# Patient Record
Sex: FEMALE | Race: WHITE | NOT HISPANIC OR LATINO | URBAN - METROPOLITAN AREA
[De-identification: names, ages, dates, MRNs, and addresses within clinical notes are randomized per-mention and may not be internally consistent; named-entity substitution may affect disease eponyms.]

---

## 2018-03-20 ENCOUNTER — INPATIENT (INPATIENT)
Facility: HOSPITAL | Age: 73
LOS: 2 days | Discharge: NURSING, REHAB OR LONG-TERM CARE | DRG: 872 | End: 2018-03-23
Attending: INTERNAL MEDICINE | Admitting: HOSPITALIST
Payer: MEDICARE

## 2018-03-20 VITALS
HEIGHT: 64 IN | OXYGEN SATURATION: 100 % | SYSTOLIC BLOOD PRESSURE: 75 MMHG | WEIGHT: 160.06 LBS | TEMPERATURE: 98 F | DIASTOLIC BLOOD PRESSURE: 54 MMHG | HEART RATE: 74 BPM

## 2018-03-20 DIAGNOSIS — N19 UNSPECIFIED KIDNEY FAILURE: ICD-10-CM

## 2018-03-20 DIAGNOSIS — Z94.0 KIDNEY TRANSPLANT STATUS: Chronic | ICD-10-CM

## 2018-03-20 LAB
ABO RH CONFIRMATION: SIGNIFICANT CHANGE UP
ALBUMIN SERPL ELPH-MCNC: 3.1 G/DL — LOW (ref 3.3–5.2)
ALP SERPL-CCNC: 251 U/L — HIGH (ref 40–120)
ALT FLD-CCNC: 19 U/L — SIGNIFICANT CHANGE UP
ANION GAP SERPL CALC-SCNC: 24 MMOL/L — HIGH (ref 5–17)
ANION GAP SERPL CALC-SCNC: 27 MMOL/L — HIGH (ref 5–17)
APAP SERPL-MCNC: <7.5 UG/ML — LOW (ref 10–26)
APPEARANCE UR: SIGNIFICANT CHANGE UP
APTT BLD: 28.7 SEC — SIGNIFICANT CHANGE UP (ref 27.5–37.4)
AST SERPL-CCNC: 27 U/L — SIGNIFICANT CHANGE UP
BACTERIA # UR AUTO: ABNORMAL
BILIRUB SERPL-MCNC: 0.4 MG/DL — SIGNIFICANT CHANGE UP (ref 0.4–2)
BILIRUB UR-MCNC: ABNORMAL
BLD GP AB SCN SERPL QL: SIGNIFICANT CHANGE UP
BUN SERPL-MCNC: 105 MG/DL — HIGH (ref 8–20)
BUN SERPL-MCNC: 111 MG/DL — HIGH (ref 8–20)
CALCIUM SERPL-MCNC: 8.5 MG/DL — LOW (ref 8.6–10.2)
CALCIUM SERPL-MCNC: 9.3 MG/DL — SIGNIFICANT CHANGE UP (ref 8.6–10.2)
CHLORIDE SERPL-SCNC: 94 MMOL/L — LOW (ref 98–107)
CHLORIDE SERPL-SCNC: 98 MMOL/L — SIGNIFICANT CHANGE UP (ref 98–107)
CK MB CFR SERPL CALC: 6.1 NG/ML — SIGNIFICANT CHANGE UP (ref 0–6.7)
CK SERPL-CCNC: 328 U/L — HIGH (ref 25–170)
CO2 SERPL-SCNC: 12 MMOL/L — LOW (ref 22–29)
CO2 SERPL-SCNC: 13 MMOL/L — LOW (ref 22–29)
COLOR SPEC: ABNORMAL
COMMENT - URINE: SIGNIFICANT CHANGE UP
CREAT SERPL-MCNC: 5.98 MG/DL — HIGH (ref 0.5–1.3)
CREAT SERPL-MCNC: 6.27 MG/DL — HIGH (ref 0.5–1.3)
DIFF PNL FLD: ABNORMAL
EPI CELLS # UR: SIGNIFICANT CHANGE UP
GAS PNL BLDV: SIGNIFICANT CHANGE UP
GLUCOSE SERPL-MCNC: 108 MG/DL — SIGNIFICANT CHANGE UP (ref 70–115)
GLUCOSE SERPL-MCNC: 99 MG/DL — SIGNIFICANT CHANGE UP (ref 70–115)
GLUCOSE UR QL: NEGATIVE — SIGNIFICANT CHANGE UP
HCT VFR BLD CALC: 24.3 % — LOW (ref 37–47)
HCT VFR BLD CALC: 29.1 % — LOW (ref 37–47)
HGB BLD-MCNC: 7.6 G/DL — LOW (ref 12–16)
HGB BLD-MCNC: 9.2 G/DL — LOW (ref 12–16)
INR BLD: 1.09 RATIO — SIGNIFICANT CHANGE UP (ref 0.88–1.16)
KETONES UR-MCNC: ABNORMAL
LEUKOCYTE ESTERASE UR-ACNC: ABNORMAL
LIDOCAIN IGE QN: 282 U/L — HIGH (ref 22–51)
LYMPHOCYTES # BLD AUTO: 10 % — LOW (ref 20–55)
MAGNESIUM SERPL-MCNC: 1.1 MG/DL — LOW (ref 1.6–2.6)
MAGNESIUM SERPL-MCNC: 1.9 MG/DL — SIGNIFICANT CHANGE UP (ref 1.6–2.6)
MCHC RBC-ENTMCNC: 30.4 PG — SIGNIFICANT CHANGE UP (ref 27–31)
MCHC RBC-ENTMCNC: 30.5 PG — SIGNIFICANT CHANGE UP (ref 27–31)
MCHC RBC-ENTMCNC: 31.3 G/DL — LOW (ref 32–36)
MCHC RBC-ENTMCNC: 31.6 G/DL — LOW (ref 32–36)
MCV RBC AUTO: 96.4 FL — SIGNIFICANT CHANGE UP (ref 81–99)
MCV RBC AUTO: 97.2 FL — SIGNIFICANT CHANGE UP (ref 81–99)
MONOCYTES NFR BLD AUTO: 8 % — SIGNIFICANT CHANGE UP (ref 3–10)
NEUTROPHILS NFR BLD AUTO: 80 % — HIGH (ref 37–73)
NEUTS BAND # BLD: 2 % — SIGNIFICANT CHANGE UP (ref 0–8)
NITRITE UR-MCNC: NEGATIVE — SIGNIFICANT CHANGE UP
PH UR: 5 — SIGNIFICANT CHANGE UP (ref 5–8)
PHOSPHATE SERPL-MCNC: 5.6 MG/DL — HIGH (ref 2.4–4.7)
PLAT MORPH BLD: NORMAL — SIGNIFICANT CHANGE UP
PLATELET # BLD AUTO: 254 K/UL — SIGNIFICANT CHANGE UP (ref 150–400)
PLATELET # BLD AUTO: 303 K/UL — SIGNIFICANT CHANGE UP (ref 150–400)
POTASSIUM SERPL-MCNC: 4.2 MMOL/L — SIGNIFICANT CHANGE UP (ref 3.5–5.3)
POTASSIUM SERPL-MCNC: 4.7 MMOL/L — SIGNIFICANT CHANGE UP (ref 3.5–5.3)
POTASSIUM SERPL-SCNC: 4.2 MMOL/L — SIGNIFICANT CHANGE UP (ref 3.5–5.3)
POTASSIUM SERPL-SCNC: 4.7 MMOL/L — SIGNIFICANT CHANGE UP (ref 3.5–5.3)
PROT SERPL-MCNC: 7 G/DL — SIGNIFICANT CHANGE UP (ref 6.6–8.7)
PROT UR-MCNC: 100
PROTHROM AB SERPL-ACNC: 12 SEC — SIGNIFICANT CHANGE UP (ref 9.8–12.7)
RBC # BLD: 2.5 M/UL — LOW (ref 4.4–5.2)
RBC # BLD: 3.02 M/UL — LOW (ref 4.4–5.2)
RBC # FLD: 15 % — SIGNIFICANT CHANGE UP (ref 11–15.6)
RBC # FLD: 15 % — SIGNIFICANT CHANGE UP (ref 11–15.6)
RBC BLD AUTO: NORMAL — SIGNIFICANT CHANGE UP
RBC CASTS # UR COMP ASSIST: ABNORMAL /HPF (ref 0–4)
SALICYLATES SERPL-MCNC: <0.6 MG/DL — LOW (ref 10–20)
SODIUM SERPL-SCNC: 133 MMOL/L — LOW (ref 135–145)
SODIUM SERPL-SCNC: 135 MMOL/L — SIGNIFICANT CHANGE UP (ref 135–145)
SP GR SPEC: 1.02 — SIGNIFICANT CHANGE UP (ref 1.01–1.02)
T3 SERPL-MCNC: 62 NG/DL — LOW (ref 80–200)
T4 AB SER-ACNC: 4.2 UG/DL — LOW (ref 4.5–12)
TSH SERPL-MCNC: 4.54 UIU/ML — HIGH (ref 0.27–4.2)
TYPE + AB SCN PNL BLD: SIGNIFICANT CHANGE UP
UROBILINOGEN FLD QL: NEGATIVE — SIGNIFICANT CHANGE UP
WBC # BLD: 11.8 K/UL — HIGH (ref 4.8–10.8)
WBC # BLD: 13.4 K/UL — HIGH (ref 4.8–10.8)
WBC # FLD AUTO: 11.8 K/UL — HIGH (ref 4.8–10.8)
WBC # FLD AUTO: 13.4 K/UL — HIGH (ref 4.8–10.8)
WBC UR QL: SIGNIFICANT CHANGE UP /HPF (ref 0–5)

## 2018-03-20 PROCEDURE — 99223 1ST HOSP IP/OBS HIGH 75: CPT

## 2018-03-20 PROCEDURE — 71045 X-RAY EXAM CHEST 1 VIEW: CPT | Mod: 26

## 2018-03-20 PROCEDURE — 70450 CT HEAD/BRAIN W/O DYE: CPT | Mod: 26

## 2018-03-20 PROCEDURE — 99285 EMERGENCY DEPT VISIT HI MDM: CPT

## 2018-03-20 PROCEDURE — 93010 ELECTROCARDIOGRAM REPORT: CPT

## 2018-03-20 RX ORDER — CYCLOSPORINE 100 MG/1
3 CAPSULE ORAL
Qty: 0 | Refills: 0 | Status: DISCONTINUED | OUTPATIENT
Start: 2018-03-20 | End: 2018-03-20

## 2018-03-20 RX ORDER — CYCLOSPORINE 100 MG/1
75 CAPSULE ORAL
Qty: 0 | Refills: 0 | Status: DISCONTINUED | OUTPATIENT
Start: 2018-03-20 | End: 2018-03-23

## 2018-03-20 RX ORDER — ASPIRIN/CALCIUM CARB/MAGNESIUM 324 MG
81 TABLET ORAL DAILY
Qty: 0 | Refills: 0 | Status: DISCONTINUED | OUTPATIENT
Start: 2018-03-20 | End: 2018-03-21

## 2018-03-20 RX ORDER — CEFTRIAXONE 500 MG/1
1000 INJECTION, POWDER, FOR SOLUTION INTRAMUSCULAR; INTRAVENOUS EVERY 24 HOURS
Qty: 0 | Refills: 0 | Status: DISCONTINUED | OUTPATIENT
Start: 2018-03-20 | End: 2018-03-21

## 2018-03-20 RX ORDER — SODIUM CHLORIDE 9 MG/ML
1000 INJECTION INTRAMUSCULAR; INTRAVENOUS; SUBCUTANEOUS
Qty: 0 | Refills: 0 | Status: DISCONTINUED | OUTPATIENT
Start: 2018-03-20 | End: 2018-03-20

## 2018-03-20 RX ORDER — MAGNESIUM SULFATE 500 MG/ML
2 VIAL (ML) INJECTION ONCE
Qty: 0 | Refills: 0 | Status: COMPLETED | OUTPATIENT
Start: 2018-03-20 | End: 2018-03-20

## 2018-03-20 RX ORDER — PANTOPRAZOLE SODIUM 20 MG/1
40 TABLET, DELAYED RELEASE ORAL
Qty: 0 | Refills: 0 | Status: DISCONTINUED | OUTPATIENT
Start: 2018-03-20 | End: 2018-03-23

## 2018-03-20 RX ORDER — SODIUM CHLORIDE 9 MG/ML
1000 INJECTION INTRAMUSCULAR; INTRAVENOUS; SUBCUTANEOUS ONCE
Qty: 0 | Refills: 0 | Status: COMPLETED | OUTPATIENT
Start: 2018-03-20 | End: 2018-03-20

## 2018-03-20 RX ORDER — HEPARIN SODIUM 5000 [USP'U]/ML
5000 INJECTION INTRAVENOUS; SUBCUTANEOUS EVERY 12 HOURS
Qty: 0 | Refills: 0 | Status: DISCONTINUED | OUTPATIENT
Start: 2018-03-20 | End: 2018-03-21

## 2018-03-20 RX ORDER — MYCOPHENOLATE MOFETIL 250 MG/1
500 CAPSULE ORAL
Qty: 0 | Refills: 0 | Status: DISCONTINUED | OUTPATIENT
Start: 2018-03-20 | End: 2018-03-23

## 2018-03-20 RX ORDER — SODIUM CHLORIDE 9 MG/ML
1000 INJECTION, SOLUTION INTRAVENOUS
Qty: 0 | Refills: 0 | Status: DISCONTINUED | OUTPATIENT
Start: 2018-03-20 | End: 2018-03-21

## 2018-03-20 RX ORDER — CEFTRIAXONE 500 MG/1
1 INJECTION, POWDER, FOR SOLUTION INTRAMUSCULAR; INTRAVENOUS ONCE
Qty: 0 | Refills: 0 | Status: COMPLETED | OUTPATIENT
Start: 2018-03-20 | End: 2018-03-20

## 2018-03-20 RX ADMIN — Medication 50 MILLIGRAM(S): at 12:28

## 2018-03-20 RX ADMIN — CYCLOSPORINE 75 MILLIGRAM(S): 100 CAPSULE ORAL at 18:13

## 2018-03-20 RX ADMIN — MYCOPHENOLATE MOFETIL 500 MILLIGRAM(S): 250 CAPSULE ORAL at 18:13

## 2018-03-20 RX ADMIN — Medication 81 MILLIGRAM(S): at 12:28

## 2018-03-20 RX ADMIN — SODIUM CHLORIDE 2000 MILLILITER(S): 9 INJECTION INTRAMUSCULAR; INTRAVENOUS; SUBCUTANEOUS at 06:23

## 2018-03-20 RX ADMIN — Medication 50 MILLIGRAM(S): at 18:12

## 2018-03-20 RX ADMIN — Medication 1 TABLET(S): at 23:02

## 2018-03-20 RX ADMIN — Medication 50 GRAM(S): at 09:42

## 2018-03-20 RX ADMIN — SODIUM CHLORIDE 2000 MILLILITER(S): 9 INJECTION INTRAMUSCULAR; INTRAVENOUS; SUBCUTANEOUS at 15:05

## 2018-03-20 RX ADMIN — SODIUM CHLORIDE 2000 MILLILITER(S): 9 INJECTION INTRAMUSCULAR; INTRAVENOUS; SUBCUTANEOUS at 15:27

## 2018-03-20 RX ADMIN — Medication 50 GRAM(S): at 05:08

## 2018-03-20 RX ADMIN — SODIUM CHLORIDE 125 MILLILITER(S): 9 INJECTION, SOLUTION INTRAVENOUS at 12:21

## 2018-03-20 RX ADMIN — HEPARIN SODIUM 5000 UNIT(S): 5000 INJECTION INTRAVENOUS; SUBCUTANEOUS at 18:12

## 2018-03-20 RX ADMIN — SODIUM CHLORIDE 125 MILLILITER(S): 9 INJECTION INTRAMUSCULAR; INTRAVENOUS; SUBCUTANEOUS at 09:37

## 2018-03-20 RX ADMIN — Medication 50 MILLIGRAM(S): at 23:02

## 2018-03-20 RX ADMIN — CEFTRIAXONE 1000 MILLIGRAM(S): 500 INJECTION, POWDER, FOR SOLUTION INTRAMUSCULAR; INTRAVENOUS at 12:29

## 2018-03-20 RX ADMIN — SODIUM CHLORIDE 2000 MILLILITER(S): 9 INJECTION INTRAMUSCULAR; INTRAVENOUS; SUBCUTANEOUS at 08:17

## 2018-03-20 RX ADMIN — CEFTRIAXONE 100 GRAM(S): 500 INJECTION, POWDER, FOR SOLUTION INTRAMUSCULAR; INTRAVENOUS at 08:17

## 2018-03-20 NOTE — ED PROVIDER NOTE - OBJECTIVE STATEMENT
This is a 71 y/o F BIBA to ED for medical evaluation. As per EMS patients home was extremely unkept, pt was found on her couch in her own feces and urine. Patients home was noted to be infested with cockroaches and rodents. Pt states she called emergency medical services due to not being able to stand her own odor anymore and being unable to get up from her couch for the past week secondary to weakness. Pt notes she has not left her home in the last month, her neighbor has been getting her grocery's. Denies N/V/D, fever, chills, SOB, CP, difficulty breathing, HA, numbness, tingling and abd pain. This is a 73 y/o F BIBA to ED for medical evaluation. As per EMS patients home was extremely unkept, pt was found on her couch in her own feces and urine. Patients home was noted to be infested with cockroaches and rodents. Pt states she called emergency medical services due to not being able to stand her own odor anymore and being unable to get up from her couch for the past week secondary to weakness. Pt notes she has not left her home in the last month, her neighbor has been getting her grocery's. Pt is on antirejection medicine for kidney transplant in 2004. She is allergic to Penicillin. Denies N/V/D, fever, chills, SOB, CP, difficulty breathing, HA, numbness, tingling and abd pain.  PCP: Dr. Lane in [place]

## 2018-03-20 NOTE — ED ADULT NURSE NOTE - OBJECTIVE STATEMENT
Pt comes to ED for weakness and "not feeling well for two weeks", denies pain, pt A&O x's 4, pt states "I haven't been able to get up in a while", pt unkept and covered in feces and urine with bugs found upon arrival

## 2018-03-20 NOTE — ED ADULT TRIAGE NOTE - CHIEF COMPLAINT QUOTE
from home c/o weakness, poor historian, as per ems pt living in very poor conditions cockroaches was sitting in the urine and feces did not eat for a long time was only taking ensure  Dr Awan at bedside evaluating pt

## 2018-03-20 NOTE — ED ADULT NURSE REASSESSMENT NOTE - NS ED NURSE REASSESS COMMENT FT1
pt received awake and tremulous with Xiong in place draining dark cloudy  urine . iv access to right arm saline locked. iv antibiotics  saline bolus initiated. no respiratory distress noted .pt educated on  plan of care explained to patient.

## 2018-03-20 NOTE — ED PROVIDER NOTE - CARE PLAN
Principal Discharge DX:	Renal failure Principal Discharge DX:	Renal failure  Secondary Diagnosis:	UTI (urinary tract infection)  Secondary Diagnosis:	Debility

## 2018-03-20 NOTE — CONSULT NOTE ADULT - ASSESSMENT
DERRICK suspect 2/2 to hypoperfusion dehydration from diarrhea and poor po intake  h/o PCKD was on PD 5 yrs had LDRT from her brother 2004 [facility] on cellcept/ tacrolimus  I called [facility] transplant PA awaiting call back- for most recent cr  Will check renal sono over transplant  Metabolic acidosis due to DERRICK will add bicarb to IVF  For now aggressive IVF which I have adjusted no urgent need for HD  Suspect UTI agree w Rocephin  Anemia will check iron studies  RO: check iPTH, Vit D

## 2018-03-20 NOTE — H&P ADULT - ASSESSMENT
The patient is a 72 year old female with a history of APCKD  status post renal transplant in 2004 at [facility] on anti-rejection meds, hypertension and a history of alcohol use who was brought to the ER by EMS for weakness.  Admitted for sepsis and acute renal failure.     Assessment/Plan:    1. Sepsis likely secondary to UTI: Continue iv rocephin, urine and blood cultures ordered. IV fluids.     2. DERRICK with metabolic acidosis and hyponatremia secondary to hypovolemia and sepsis: Aggressive iv hydration and NS/HCO3, monitor bmp, renal ultrasound. Nephrology consulted    3. History of APCKD status post transplant: Resume cellcept and gengraf    4. Hypertension: Hold metoprolol for hypotension. Monitor bp closely    5. History of etoh use: Ciwa protocol, ativan prn.     6. Anemia: Check iron panel, fobt, ferritin, b12 and folate levels.     7. Hypomagnesemia: Replace mgso4. Monitor lytes.     8. Elevated TSH: Check t3 and t4 levels. Sick euthryoid?    9. Mild rhabdomyolysis: IV hydration.      VTE- heparin subcut

## 2018-03-20 NOTE — CONSULT NOTE ADULT - SUBJECTIVE AND OBJECTIVE BOX
HPI: 73 y/o F BIBA to ED for medical evaluation. As per EMS patients home was extremely unkept, pt was found on her couch in her own feces and urine. Patients home was noted to be infested with cockroaches and rodents. Pt states she called emergency medical services due to not being able to stand her own odor anymore and being unable to get up from her couch for the past week secondary to weakness. Pt notes she has not left her home in the last month, her neighbor has been getting her grocery's. Pt is on antirejection medicine for kidney transplant in . She is allergic to Penicillin. Denies N/V/D, fever, chills, SOB, CP, difficulty breathing, HA, numbness, tingling and abd pain.    cr found to be 5.9 for which we are consulted    ROS: per HPI    PAST MEDICAL & SURGICAL HISTORY:  Polycystic kidney disease  S/P kidney transplant      FAMILY HISTORY:  NC    Social History:Non smoker    MEDICATIONS  (STANDING):  dextrose 5% + sodium chloride 0.45% 1000 milliLiter(s) (125 mL/Hr) IV Continuous <Continuous>  heparin  Injectable 5000 Unit(s) SubCutaneous every 12 hours    MEDICATIONS  (PRN):   Meds reviewed    Allergies    penicillin (Rash; Short breath; Urticaria; Hives)      Vital Signs Last 24 Hrs  T(C): 37 (20 Mar 2018 08:46), Max: 37 (20 Mar 2018 08:46)  T(F): 98.6 (20 Mar 2018 08:46), Max: 98.6 (20 Mar 2018 08:46)  HR: 96 (20 Mar 2018 08:46) (74 - 96)  BP: 80/51 (20 Mar 2018 08:46) (75/54 - 95/51)  BP(mean): --  RR: 19 (20 Mar 2018 08:46) (19 - 20)  SpO2: 97% (20 Mar 2018 08:46) (95% - 100%)  Daily Height in cm: 162.56 (20 Mar 2018 00:21)        PHYSICAL EXAM:    GENERAL: appears chronically ill, dry mucus membrane  HEAD:  Atraumatic, Normocephalic  EYES: EOMI  NECK: Supple, neck  veins full  NERVOUS SYSTEM:  Alert & Oriented X3   CHEST/LUNG: Clear to percussion bilaterally; No rales  HEART: Regular rate and rhythm; No murmur  ABDOMEN: Soft, Nontender, Nondistended; Bowel sounds present  EXTREMITIES:  trace edema    LABS:                        7.6    11.8  )-----------( 254      ( 20 Mar 2018 09:05 )             24.3         135  |  98  |  105.0<H>  ----------------------------<  108  4.2   |  13.0<L>  |  5.98<H>    Ca    8.5<L>      20 Mar 2018 09:05  Phos  5.6       Mg     1.9         TPro  7.0  /  Alb  3.1<L>  /  TBili  0.4  /  DBili  x   /  AST  27  /  ALT  19  /  AlkPhos  251<H>      PT/INR - ( 20 Mar 2018 01:21 )   PT: 12.0 sec;   INR: 1.09 ratio         PTT - ( 20 Mar 2018 01:21 )  PTT:28.7 sec  Urinalysis Basic - ( 20 Mar 2018 05:42 )    Color: Brown / Appearance: Turbid / S.020 / pH: x  Gluc: x / Ketone: Trace  / Bili: Moderate / Urobili: Negative   Blood: x / Protein: 100 / Nitrite: Negative   Leuk Esterase: Moderate / RBC: 11-25 /HPF / WBC TNTC /HPF   Sq Epi: x / Non Sq Epi: Few / Bacteria: Moderate      Magnesium, Serum: 1.9 mg/dL ( @ 09:05)  Magnesium, Serum: 1.1 mg/dL ( @ 03:27)  Phosphorus Level, Serum: 5.6 mg/dL ( @ 03:27)          RADIOLOGY & ADDITIONAL TESTS:

## 2018-03-20 NOTE — H&P ADULT - HISTORY OF PRESENT ILLNESS
The patient is a 72 year old female with a history of APCKD  status post renal transplant in 2004 at [facility] on anti-rejection meds, hypertension and a history of alcohol use who was brought to the ER by EMS for weakness. According to the patient about 1 month ago she was in her usual state of health when she developed episodes of diarrhea. She had persistent diarrhea and vomiting made worse by eating. She stopped eating and was only drinking ensure. She slowly lost her appetite and became very weak. She lives alone and had difficulty ambulating around her house. She continued to take all her medications. About a weak ago she felt so weak that she was unable to get up. She had been laying in her feces and urine for about a weak. Last night she spoke to her son in [place], he thought maybe she was withdrawing from alcohol and suggesting she drink so she had a glass of vodka. She admits to having a daily drink for years and recently was cutting down. Her last drink prior to last night was  in January. EMS found the patient laying on the floor in her urine. In the ER, noted to have john with metabolic acidosis, hyponatremia and hypomagnesemia. UA positive, was given 2 liters of normal saline for hypotension and john and a dose of iv Rocephin. Per patient her baseline creatinine has been about 1-1.0

## 2018-03-20 NOTE — H&P ADULT - PMH
Basal cell carcinoma    Essential hypertension    Polycystic kidney disease    Renal transplant recipient

## 2018-03-21 DIAGNOSIS — N17.9 ACUTE KIDNEY FAILURE, UNSPECIFIED: ICD-10-CM

## 2018-03-21 DIAGNOSIS — F10.10 ALCOHOL ABUSE, UNCOMPLICATED: ICD-10-CM

## 2018-03-21 DIAGNOSIS — D64.9 ANEMIA, UNSPECIFIED: ICD-10-CM

## 2018-03-21 DIAGNOSIS — K52.9 NONINFECTIVE GASTROENTERITIS AND COLITIS, UNSPECIFIED: ICD-10-CM

## 2018-03-21 DIAGNOSIS — S89.91XS UNSPECIFIED INJURY OF RIGHT LOWER LEG, SEQUELA: ICD-10-CM

## 2018-03-21 DIAGNOSIS — Z94.0 KIDNEY TRANSPLANT STATUS: ICD-10-CM

## 2018-03-21 DIAGNOSIS — N39.0 URINARY TRACT INFECTION, SITE NOT SPECIFIED: ICD-10-CM

## 2018-03-21 DIAGNOSIS — A41.9 SEPSIS, UNSPECIFIED ORGANISM: ICD-10-CM

## 2018-03-21 LAB
24R-OH-CALCIDIOL SERPL-MCNC: 31.3 NG/ML — SIGNIFICANT CHANGE UP (ref 30–80)
ANION GAP SERPL CALC-SCNC: 19 MMOL/L — HIGH (ref 5–17)
BUN SERPL-MCNC: 85 MG/DL — HIGH (ref 8–20)
CALCIUM SERPL-MCNC: 7 MG/DL — LOW (ref 8.6–10.2)
CALCIUM SERPL-MCNC: 7.2 MG/DL — LOW (ref 8.4–10.5)
CHLORIDE SERPL-SCNC: 103 MMOL/L — SIGNIFICANT CHANGE UP (ref 98–107)
CK SERPL-CCNC: 395 U/L — HIGH (ref 25–170)
CO2 SERPL-SCNC: 14 MMOL/L — LOW (ref 22–29)
CREAT SERPL-MCNC: 4.3 MG/DL — HIGH (ref 0.5–1.3)
CYCLOSPORINE SER-MCNC: 202 NG/ML — SIGNIFICANT CHANGE UP (ref 150–400)
FOLATE SERPL-MCNC: 11 NG/ML — SIGNIFICANT CHANGE UP (ref 4–16)
GLUCOSE SERPL-MCNC: 116 MG/DL — HIGH (ref 70–115)
HCT VFR BLD CALC: 21.1 % — LOW (ref 37–47)
HCT VFR BLD CALC: 21.8 % — LOW (ref 37–47)
HCT VFR BLD CALC: 22.7 % — LOW (ref 37–47)
HGB BLD-MCNC: 6.8 G/DL — CRITICAL LOW (ref 12–16)
HGB BLD-MCNC: 6.9 G/DL — CRITICAL LOW (ref 12–16)
HGB BLD-MCNC: 7.3 G/DL — LOW (ref 12–16)
IRON SATN MFR SERPL: 57 UG/DL — SIGNIFICANT CHANGE UP (ref 37–145)
MAGNESIUM SERPL-MCNC: 1.6 MG/DL — SIGNIFICANT CHANGE UP (ref 1.6–2.6)
MCHC RBC-ENTMCNC: 30.4 PG — SIGNIFICANT CHANGE UP (ref 27–31)
MCHC RBC-ENTMCNC: 31.2 G/DL — LOW (ref 32–36)
MCV RBC AUTO: 97.3 FL — SIGNIFICANT CHANGE UP (ref 81–99)
PLATELET # BLD AUTO: 228 K/UL — SIGNIFICANT CHANGE UP (ref 150–400)
POTASSIUM SERPL-MCNC: 3.2 MMOL/L — LOW (ref 3.5–5.3)
POTASSIUM SERPL-SCNC: 3.2 MMOL/L — LOW (ref 3.5–5.3)
PTH-INTACT FLD-MCNC: 190 PG/ML — HIGH (ref 15–65)
RBC # BLD: 2.24 M/UL — LOW (ref 4.4–5.2)
RBC # FLD: 15.1 % — SIGNIFICANT CHANGE UP (ref 11–15.6)
SODIUM SERPL-SCNC: 136 MMOL/L — SIGNIFICANT CHANGE UP (ref 135–145)
T3 SERPL-MCNC: 47 NG/DL — LOW (ref 80–200)
VIT B12 SERPL-MCNC: 618 PG/ML — SIGNIFICANT CHANGE UP (ref 232–1245)
WBC # BLD: 5.9 K/UL — SIGNIFICANT CHANGE UP (ref 4.8–10.8)
WBC # FLD AUTO: 5.9 K/UL — SIGNIFICANT CHANGE UP (ref 4.8–10.8)

## 2018-03-21 PROCEDURE — 76775 US EXAM ABDO BACK WALL LIM: CPT | Mod: 26

## 2018-03-21 PROCEDURE — 99233 SBSQ HOSP IP/OBS HIGH 50: CPT

## 2018-03-21 PROCEDURE — 99291 CRITICAL CARE FIRST HOUR: CPT

## 2018-03-21 PROCEDURE — 99223 1ST HOSP IP/OBS HIGH 75: CPT

## 2018-03-21 PROCEDURE — 93010 ELECTROCARDIOGRAM REPORT: CPT

## 2018-03-21 RX ORDER — SODIUM CHLORIDE 9 MG/ML
1000 INJECTION INTRAMUSCULAR; INTRAVENOUS; SUBCUTANEOUS ONCE
Qty: 0 | Refills: 0 | Status: DISCONTINUED | OUTPATIENT
Start: 2018-03-21 | End: 2018-03-21

## 2018-03-21 RX ORDER — SODIUM BICARBONATE 1 MEQ/ML
0.21 SYRINGE (ML) INTRAVENOUS
Qty: 150 | Refills: 0 | Status: DISCONTINUED | OUTPATIENT
Start: 2018-03-21 | End: 2018-03-23

## 2018-03-21 RX ORDER — POTASSIUM CHLORIDE 20 MEQ
10 PACKET (EA) ORAL
Qty: 0 | Refills: 0 | Status: COMPLETED | OUTPATIENT
Start: 2018-03-21 | End: 2018-03-21

## 2018-03-21 RX ORDER — INFLUENZA VIRUS VACCINE 15; 15; 15; 15 UG/.5ML; UG/.5ML; UG/.5ML; UG/.5ML
0.5 SUSPENSION INTRAMUSCULAR ONCE
Qty: 0 | Refills: 0 | Status: DISCONTINUED | OUTPATIENT
Start: 2018-03-21 | End: 2018-03-21

## 2018-03-21 RX ORDER — ASCORBIC ACID 60 MG
1500 TABLET,CHEWABLE ORAL EVERY 6 HOURS
Qty: 0 | Refills: 0 | Status: DISCONTINUED | OUTPATIENT
Start: 2018-03-21 | End: 2018-03-23

## 2018-03-21 RX ORDER — HYDROCORTISONE 20 MG
50 TABLET ORAL EVERY 8 HOURS
Qty: 0 | Refills: 0 | Status: DISCONTINUED | OUTPATIENT
Start: 2018-03-21 | End: 2018-03-23

## 2018-03-21 RX ORDER — ERYTHROPOIETIN 10000 [IU]/ML
10000 INJECTION, SOLUTION INTRAVENOUS; SUBCUTANEOUS
Qty: 0 | Refills: 0 | Status: DISCONTINUED | OUTPATIENT
Start: 2018-03-21 | End: 2018-03-23

## 2018-03-21 RX ORDER — ASCORBIC ACID 60 MG
1500 TABLET,CHEWABLE ORAL
Qty: 0 | Refills: 0 | Status: DISCONTINUED | OUTPATIENT
Start: 2018-03-21 | End: 2018-03-21

## 2018-03-21 RX ORDER — MEROPENEM 1 G/30ML
500 INJECTION INTRAVENOUS EVERY 12 HOURS
Qty: 0 | Refills: 0 | Status: DISCONTINUED | OUTPATIENT
Start: 2018-03-21 | End: 2018-03-23

## 2018-03-21 RX ORDER — LEVOTHYROXINE SODIUM 125 MCG
50 TABLET ORAL DAILY
Qty: 0 | Refills: 0 | Status: DISCONTINUED | OUTPATIENT
Start: 2018-03-21 | End: 2018-03-23

## 2018-03-21 RX ORDER — THIAMINE MONONITRATE (VIT B1) 100 MG
200 TABLET ORAL
Qty: 0 | Refills: 0 | Status: DISCONTINUED | OUTPATIENT
Start: 2018-03-21 | End: 2018-03-23

## 2018-03-21 RX ORDER — ACETAMINOPHEN 500 MG
650 TABLET ORAL ONCE
Qty: 0 | Refills: 0 | Status: DISCONTINUED | OUTPATIENT
Start: 2018-03-21 | End: 2018-03-21

## 2018-03-21 RX ORDER — SODIUM CHLORIDE 9 MG/ML
1000 INJECTION INTRAMUSCULAR; INTRAVENOUS; SUBCUTANEOUS ONCE
Qty: 0 | Refills: 0 | Status: COMPLETED | OUTPATIENT
Start: 2018-03-21 | End: 2018-03-21

## 2018-03-21 RX ORDER — ASCORBIC ACID 60 MG
1500 TABLET,CHEWABLE ORAL EVERY 6 HOURS
Qty: 0 | Refills: 0 | Status: DISCONTINUED | OUTPATIENT
Start: 2018-03-21 | End: 2018-03-21

## 2018-03-21 RX ORDER — POTASSIUM CHLORIDE 20 MEQ
40 PACKET (EA) ORAL ONCE
Qty: 0 | Refills: 0 | Status: DISCONTINUED | OUTPATIENT
Start: 2018-03-21 | End: 2018-03-21

## 2018-03-21 RX ORDER — MIDODRINE HYDROCHLORIDE 2.5 MG/1
5 TABLET ORAL THREE TIMES A DAY
Qty: 0 | Refills: 0 | Status: DISCONTINUED | OUTPATIENT
Start: 2018-03-21 | End: 2018-03-22

## 2018-03-21 RX ADMIN — Medication 50 MILLIGRAM(S): at 06:20

## 2018-03-21 RX ADMIN — Medication 100 MEQ/KG/HR: at 15:27

## 2018-03-21 RX ADMIN — ERYTHROPOIETIN 10000 UNIT(S): 10000 INJECTION, SOLUTION INTRAVENOUS; SUBCUTANEOUS at 16:05

## 2018-03-21 RX ADMIN — Medication 100 MILLIEQUIVALENT(S): at 10:26

## 2018-03-21 RX ADMIN — Medication 50 MILLIGRAM(S): at 16:05

## 2018-03-21 RX ADMIN — CYCLOSPORINE 75 MILLIGRAM(S): 100 CAPSULE ORAL at 06:50

## 2018-03-21 RX ADMIN — MYCOPHENOLATE MOFETIL 500 MILLIGRAM(S): 250 CAPSULE ORAL at 06:21

## 2018-03-21 RX ADMIN — Medication 103 MILLIGRAM(S): at 17:41

## 2018-03-21 RX ADMIN — MIDODRINE HYDROCHLORIDE 5 MILLIGRAM(S): 2.5 TABLET ORAL at 15:42

## 2018-03-21 RX ADMIN — HEPARIN SODIUM 5000 UNIT(S): 5000 INJECTION INTRAVENOUS; SUBCUTANEOUS at 06:20

## 2018-03-21 RX ADMIN — MIDODRINE HYDROCHLORIDE 5 MILLIGRAM(S): 2.5 TABLET ORAL at 23:33

## 2018-03-21 RX ADMIN — SODIUM CHLORIDE 4000 MILLILITER(S): 9 INJECTION INTRAMUSCULAR; INTRAVENOUS; SUBCUTANEOUS at 10:23

## 2018-03-21 RX ADMIN — MYCOPHENOLATE MOFETIL 500 MILLIGRAM(S): 250 CAPSULE ORAL at 17:41

## 2018-03-21 RX ADMIN — CYCLOSPORINE 75 MILLIGRAM(S): 100 CAPSULE ORAL at 17:41

## 2018-03-21 RX ADMIN — Medication 200 MILLIGRAM(S): at 17:41

## 2018-03-21 RX ADMIN — Medication 100 MILLIEQUIVALENT(S): at 12:46

## 2018-03-21 RX ADMIN — Medication 1 TABLET(S): at 23:47

## 2018-03-21 RX ADMIN — Medication 103 MILLIGRAM(S): at 23:47

## 2018-03-21 RX ADMIN — Medication 50 MILLIGRAM(S): at 22:30

## 2018-03-21 RX ADMIN — Medication 100 MILLIEQUIVALENT(S): at 11:35

## 2018-03-21 RX ADMIN — MEROPENEM 100 MILLIGRAM(S): 1 INJECTION INTRAVENOUS at 17:41

## 2018-03-21 RX ADMIN — PANTOPRAZOLE SODIUM 40 MILLIGRAM(S): 20 TABLET, DELAYED RELEASE ORAL at 06:20

## 2018-03-21 RX ADMIN — CEFTRIAXONE 1000 MILLIGRAM(S): 500 INJECTION, POWDER, FOR SOLUTION INTRAMUSCULAR; INTRAVENOUS at 12:46

## 2018-03-21 NOTE — PROGRESS NOTE ADULT - SUBJECTIVE AND OBJECTIVE BOX
NEPHROLOGY INTERVAL HPI/OVERNIGHT EVENTS:  pt feels better  Minimal diarrhea  Tolerating diet  Voiding clear urine    MEDICATIONS  (STANDING):  cefTRIAXone Injectable. 1000 milliGRAM(s) IV Push every 24 hours  chlordiazePOXIDE   Oral   chlordiazePOXIDE 50 milliGRAM(s) Oral every 8 hours  cycloSPORINE  , modified (GENGRAF) 75 milliGRAM(s) Oral two times a day  dextrose 5% + sodium chloride 0.45% 1000 milliLiter(s) (125 mL/Hr) IV Continuous <Continuous>  influenza   Vaccine 0.5 milliLiter(s) IntraMuscular once  mycophenolate mofetil 500 milliGRAM(s) Oral two times a day  pantoprazole    Tablet 40 milliGRAM(s) Oral before breakfast  potassium chloride  10 mEq/100 mL IVPB 10 milliEquivalent(s) IV Intermittent every 1 hour  trimethoprim   80 mG/sulfamethoxazole 400 mG 1 Tablet(s) Oral at bedtime    MEDICATIONS  (PRN):  LORazepam   Injectable 1 milliGRAM(s) IV Push every 2 hours PRN CIWA-Ar score increase by 2 points and a total score of 7 or less      Allergies    penicillin (Rash; Short breath; Urticaria; Hives)          Vital Signs Last 24 Hrs  T(C): 36.2 (21 Mar 2018 05:07), Max: 37 (20 Mar 2018 12:08)  T(F): 97.1 (21 Mar 2018 05:07), Max: 98.6 (20 Mar 2018 12:08)  HR: 84 (21 Mar 2018 09:00) (74 - 96)  BP: 67/42 (21 Mar 2018 09:00) (67/42 - 94/57)  BP(mean): --  RR: 21 (21 Mar 2018 05:07) (17 - 22)  SpO2: 100% (21 Mar 2018 07:36) (94% - 100%)    PHYSICAL EXAM:  GENERAL: Thin, frail  HEAD:  Atraumatic, Normocephalic  EYES: EOMI  NECK: Supple, no jvd  NERVOUS SYSTEM:  Alert & Oriented X3   CHEST/LUNG: Clear; diminished BS at bases  HEART: Regular rate and rhythm; No rub  ABDOMEN: Soft, Nontender, Nondistended; Bowel sounds +  EXTREMITIES:  no edema    LABS:                        6.8    5.9   )-----------( 228      ( 21 Mar 2018 06:12 )             21.8     03-21    136  |  103  |  85.0<H>  ----------------------------<  116<H>  3.2<L>   |  14.0<L>  |  4.30<H>    Ca    7.0<L>      21 Mar 2018 06:12  Phos  5.6       Mg     1.6         TPro  7.0  /  Alb  3.1<L>  /  TBili  0.4  /  DBili  x   /  AST  27  /  ALT  19  /  AlkPhos  251<H>  20    PT/INR - ( 20 Mar 2018 01:21 )   PT: 12.0 sec;   INR: 1.09 ratio         PTT - ( 20 Mar 2018 01:21 )  PTT:28.7 sec  Urinalysis Basic - ( 20 Mar 2018 05:42 )    Color: Brown / Appearance: Turbid / S.020 / pH: x  Gluc: x / Ketone: Trace  / Bili: Moderate / Urobili: Negative   Blood: x / Protein: 100 / Nitrite: Negative   Leuk Esterase: Moderate / RBC: 11-25 /HPF / WBC TNTC /HPF   Sq Epi: x / Non Sq Epi: Few / Bacteria: Moderate      Magnesium, Serum: 1.6 mg/dL ( @ 06:12)      RADIOLOGY & ADDITIONAL TESTS:

## 2018-03-21 NOTE — CONSULT NOTE ADULT - PROBLEM SELECTOR RECOMMENDATION 3
Baseline Cr in 1.0-1.9, presented with Cr 6.27.   Cr trending down, continue to monitor.   Hold nephrotoxic meds  Continue aggressive hydration as tolerated by patient  Trend urine output  Follow up BUN/Creatinine  follow up electrolytes, replace as needed.   Suggest renal U/S.   Nephrology following.   Transplant Nephrologist ?Dr. Hernández, from [facility]

## 2018-03-21 NOTE — PROGRESS NOTE ADULT - SUBJECTIVE AND OBJECTIVE BOX
CC: Weakness    INTERVAL HPI/OVERNIGHT EVENTS: Patient seen an      Vital Signs Last 24 Hrs  T(C): 36.2 (21 Mar 2018 05:07), Max: 37 (20 Mar 2018 12:08)  T(F): 97.1 (21 Mar 2018 05:07), Max: 98.6 (20 Mar 2018 12:08)  HR: 79 (21 Mar 2018 11:23) (74 - 96)  BP: 95/64 (21 Mar 2018 11:23) (67/42 - 95/64)  BP(mean): --  RR: 21 (21 Mar 2018 05:07) (17 - 22)  SpO2: 100% (21 Mar 2018 09:50) (94% - 100%)    PHYSICAL EXAM:    GENERAL: NAD, well-groomed, well-developed  HEAD:  Atraumatic, Normocephalic  EYES: EOMI, PERRLA, conjunctiva and sclera clear  ENMT: Moist mucous membranes  NECK: Supple, No JVD  NERVOUS SYSTEM:  Alert & Oriented X3, Motor Strength 5/5 B/L upper and lower extremities; DTRs 2+ intact and symmetric  CHEST/LUNG: Clear to auscultation bilaterally; No rales, rhonchi, wheezing, or rubs  HEART: Regular rate and rhythm; No murmurs, rubs, or gallops  ABDOMEN: Soft, Nontender, Nondistended; Bowel sounds present  EXTREMITIES:  2+ Peripheral Pulses, No clubbing, cyanosis, or edema        MEDICATIONS  (STANDING):  cefTRIAXone Injectable. 1000 milliGRAM(s) IV Push every 24 hours  chlordiazePOXIDE   Oral   chlordiazePOXIDE 50 milliGRAM(s) Oral every 8 hours  cycloSPORINE  , modified (GENGRAF) 75 milliGRAM(s) Oral two times a day  epoetin roseann Injectable 27471 Unit(s) SubCutaneous <User Schedule>  influenza   Vaccine 0.5 milliLiter(s) IntraMuscular once  mycophenolate mofetil 500 milliGRAM(s) Oral two times a day  pantoprazole    Tablet 40 milliGRAM(s) Oral before breakfast  potassium chloride  10 mEq/100 mL IVPB 10 milliEquivalent(s) IV Intermittent every 1 hour  sodium bicarbonate  Infusion 0.207 mEq/kG/Hr (100 mL/Hr) IV Continuous <Continuous>  trimethoprim   80 mG/sulfamethoxazole 400 mG 1 Tablet(s) Oral at bedtime    MEDICATIONS  (PRN):  LORazepam   Injectable 1 milliGRAM(s) IV Push every 2 hours PRN CIWA-Ar score increase by 2 points and a total score of 7 or less      Allergies    penicillin (Rash; Short breath; Urticaria; Hives)    Intolerances          LABS:                          6.8    5.9   )-----------( 228      ( 21 Mar 2018 06:12 )             21.8     03-    136  |  103  |  85.0<H>  ----------------------------<  116<H>  3.2<L>   |  14.0<L>  |  4.30<H>    Ca    7.0<L>      21 Mar 2018 06:12  Phos  5.6       Mg     1.6         TPro  7.0  /  Alb  3.1<L>  /  TBili  0.4  /  DBili  x   /  AST  27  /  ALT  19  /  AlkPhos  251<H>  20    PT/INR - ( 20 Mar 2018 01:21 )   PT: 12.0 sec;   INR: 1.09 ratio         PTT - ( 20 Mar 2018 01:21 )  PTT:28.7 sec  Urinalysis Basic - ( 20 Mar 2018 05:42 )    Color: Brown / Appearance: Turbid / S.020 / pH: x  Gluc: x / Ketone: Trace  / Bili: Moderate / Urobili: Negative   Blood: x / Protein: 100 / Nitrite: Negative   Leuk Esterase: Moderate / RBC: 11-25 /HPF / WBC TNTC /HPF   Sq Epi: x / Non Sq Epi: Few / Bacteria: Moderate        RADIOLOGY & ADDITIONAL TESTS: CC: Weakness    INTERVAL HPI/OVERNIGHT EVENTS: Patient seen and examined, lethargic this morning and hypotensive SBP 64/40. She was given 1 liter of normal saline. She was more alert but lethargic complaining of chest pain and mild SOB. +chills       Vital Signs Last 24 Hrs  T(C): 36.2 (21 Mar 2018 05:07), Max: 37 (20 Mar 2018 12:08)  T(F): 97.1 (21 Mar 2018 05:07), Max: 98.6 (20 Mar 2018 12:08)  HR: 79 (21 Mar 2018 11:23) (74 - 96)  BP: 95/64 (21 Mar 2018 11:23) (67/42 - 95/64)  BP(mean): --  RR: 21 (21 Mar 2018 05:07) (17 - 22)  SpO2: 100% (21 Mar 2018 09:50) (94% - 100%)    PHYSICAL EXAM:    GENERAL: AOX3, but lethargic   HEAD:  Atraumatic, Normocephalic  EYES: EOMI, PERRLA, conjunctiva and sclera clear  ENMT: Moist mucous membranes  NECK: Supple, No JVD  CHEST/LUNG: Clear to auscultation bilaterally; No rales, rhonchi, wheezing, or rubs  HEART: Regular rate and rhythm; No murmurs, rubs, or gallops  ABDOMEN: Soft, Nontender, Nondistended; Bowel sounds present  EXTREMITIES:  2+ Peripheral Pulses, No clubbing, cyanosis, or edema        MEDICATIONS  (STANDING):  cefTRIAXone Injectable. 1000 milliGRAM(s) IV Push every 24 hours  chlordiazePOXIDE   Oral   chlordiazePOXIDE 50 milliGRAM(s) Oral every 8 hours  cycloSPORINE  , modified (GENGRAF) 75 milliGRAM(s) Oral two times a day  epoetin roseann Injectable 72470 Unit(s) SubCutaneous <User Schedule>  influenza   Vaccine 0.5 milliLiter(s) IntraMuscular once  mycophenolate mofetil 500 milliGRAM(s) Oral two times a day  pantoprazole    Tablet 40 milliGRAM(s) Oral before breakfast  potassium chloride  10 mEq/100 mL IVPB 10 milliEquivalent(s) IV Intermittent every 1 hour  sodium bicarbonate  Infusion 0.207 mEq/kG/Hr (100 mL/Hr) IV Continuous <Continuous>  trimethoprim   80 mG/sulfamethoxazole 400 mG 1 Tablet(s) Oral at bedtime    MEDICATIONS  (PRN):  LORazepam   Injectable 1 milliGRAM(s) IV Push every 2 hours PRN CIWA-Ar score increase by 2 points and a total score of 7 or less      Allergies    penicillin (Rash; Short breath; Urticaria; Hives)    Intolerances          LABS:                          6.8    5.9   )-----------( 228      ( 21 Mar 2018 06:12 )             21.8     03-    136  |  103  |  85.0<H>  ----------------------------<  116<H>  3.2<L>   |  14.0<L>  |  4.30<H>    Ca    7.0<L>      21 Mar 2018 06:12  Phos  5.6       Mg     1.6         TPro  7.0  /  Alb  3.1<L>  /  TBili  0.4  /  DBili  x   /  AST  27  /  ALT  19  /  AlkPhos  251<H>  20    PT/INR - ( 20 Mar 2018 01:21 )   PT: 12.0 sec;   INR: 1.09 ratio         PTT - ( 20 Mar 2018 01:21 )  PTT:28.7 sec  Urinalysis Basic - ( 20 Mar 2018 05:42 )    Color: Brown / Appearance: Turbid / S.020 / pH: x  Gluc: x / Ketone: Trace  / Bili: Moderate / Urobili: Negative   Blood: x / Protein: 100 / Nitrite: Negative   Leuk Esterase: Moderate / RBC: 11-25 /HPF / WBC TNTC /HPF   Sq Epi: x / Non Sq Epi: Few / Bacteria: Moderate        RADIOLOGY & ADDITIONAL TESTS:

## 2018-03-21 NOTE — PROCEDURE NOTE - NSPROCDETAILS_GEN_ALL_CORE
secured in place/sterile technique, catheter placed/ultrasound utilization/location identified, draped/prepped, sterile technique used/blood seen on insertion/flushes easily/dressing applied

## 2018-03-21 NOTE — PROGRESS NOTE ADULT - ASSESSMENT
The patient is a 72 year old female with a history of APCKD  status post renal transplant in 2004 at [facility] on anti-rejection meds, hypertension and a history of alcohol use who was brought to the ER by EMS for weakness.  Admitted for sepsis and acute renal failure.     Assessment/Plan:    1. Sepsis likely secondary to UTI: Continue iv rocephin, urine and blood cultures ordered. IV fluids.     2. DERRICK with metabolic acidosis and hyponatremia secondary to hypovolemia and sepsis: Aggressive iv hydration and NS/HCO3, monitor bmp, renal ultrasound. Nephrology consulted    3. History of APCKD status post transplant: Resume cellcept and gengraf    4. Hypertension: Hold metoprolol for hypotension. Monitor bp closely    5. History of etoh use: Ciwa protocol, ativan prn.     6. Anemia: Check iron panel, fobt, ferritin, b12 and folate levels.     7. Hypomagnesemia: Replace mgso4. Monitor lytes.     8. Elevated TSH: Check t3 and t4 levels. Sick euthryoid?    9. Mild rhabdomyolysis: IV hydration.      VTE- heparin subcut The patient is a 72 year old female with a history of APCKD  status post renal transplant in 2004 at [facility] on anti-rejection meds, hypertension and a history of alcohol use who was brought to the ER by EMS for weakness.  Admitted for sepsis and acute renal failure.     Assessment/Plan:    1. Sepsis likely secondary to UTI: On IV rocephin day 2, + 6 liters with minimal improvement in BP.     2. DERRICK with metabolic acidosis and hyponatremia secondary to hypovolemia and sepsis: Aggressive iv hydration now + 6 liters and NS/HCO3, monitor bmp, renal tranplant ultrasound ordered when BP stable     3. History of APCKD status post transplant:On cellcept and gengraf  Check cylosporine level  Called [facility] to discuss case with patient's tranplant physician Dr Roth, awaiting call back    4. Hypertension: Hold metoprolol for hypotension. Monitor bp closely    5. History of etoh use: Ciwa protocol, ativan prn.     6. Anemia: Check iron panel, fobt, ferritin, b12 and folate levels.  No acute signs of bleeding  Transfuse 1 unit of prbc for hypotesnion and symptomatic anemia    7. Chest pain: Stat EKG now     7. Hypokalemia repelete    8. Elevated TSH: Check t3 and t4 levels. Sick euthryoid?    9. Mild rhabdomyolysis: IV hydration.        Spoke with ICU PA for consult. Patient is a transplant patient with sepsis minimally responsive to fluids with acute renal failure and symptomatic anemia. REquires higher level  of care. Call placed to patient's trasplant center [facility] as well The patient is a 72 year old female with a history of APCKD  status post renal transplant in 2004 at [facility] on anti-rejection meds, hypertension and a history of alcohol use who was brought to the ER by EMS for weakness.  Admitted for sepsis and acute renal failure.     Assessment/Plan:    1. Sepsis likely secondary to UTI: On IV rocephin day 2, + 6 liters with minimal improvement in BP.  Hypothermic- warming blanket. ID consult for recommendations  Start solu cortef    2. DERRICK with metabolic acidosis and hyponatremia secondary to hypovolemia and sepsis: Aggressive iv hydration now + 6 liters and NS/HCO3, monitor bmp, renal tranplant ultrasound ordered when BP stable     3. History of APCKD status post transplant:On cellcept and gengraf  Check cylosporine level  Called [facility] to discuss case with patient's tranplant physician Dr Roth, awaiting call back    4. Hypertension: Hold metoprolol for hypotension. Monitor bp closely    5. History of etoh use: Ciwa protocol, ativan prn.     6. Anemia: Check iron panel, fobt, ferritin, b12 and folate levels.  No acute signs of bleeding  Transfuse 1 unit of prbc for hypotesnion and symptomatic anemia    7. Chest pain: Stat EKG now     7. Hypokalemia repelete    8. Elevated TSH: Check t3 and t4 levels. Sick euthryoid?    9. Mild rhabdomyolysis: IV hydration.        Spoke with ICU PA for consult. Patient is a transplant patient with sepsis minimally responsive to fluids with acute renal failure and symptomatic anemia. REquires higher level  of care. Call placed to patient's trasplant center [facility] as well

## 2018-03-21 NOTE — PROCEDURE NOTE - PROCEDURE
<<-----Click on this checkbox to enter Procedure Peripheral intravenous catheter assessment  03/21/2018  #20 G  IV  1.75"  ns flush good heme back right brachial vein  Active  JSTEELE  Vascular access with ultrasound guidance  03/21/2018  Patent right brachial vein  Active  JSTEELE

## 2018-03-21 NOTE — CONSULT NOTE ADULT - PROBLEM SELECTOR RECOMMENDATION 9
Continue hydration as tolerated by patient.    UA +, urine and blood cultures pending   Continue broad specturm abx.   Suggest ID consult due to patient being immunosuppressed.

## 2018-03-21 NOTE — CONSULT NOTE ADULT - ATTENDING COMMENTS
I have seen and evaluated the patient independently with the following additions:    - right knee pain appears chronic no erythemia, R>L but chronic will order plain films will consider ortho consult     - diarrhea - improved in hospital possible due to ensure will monitor will do further workup if diarrhea recurrs    - UTI continue antibiotics ID following    - anemia likely chronic and now worsening because of dilutional component, transfuse 1 unit PRBC    - hypotension improved keep MAP >65     - DERRICK likely due to UTI and dehydration renal US pending    Will watch in the ICU at this point in time

## 2018-03-21 NOTE — CONSULT NOTE ADULT - ASSESSMENT
Patient is a a 73 yo f pmhx polycystic kidney disease s/p transplant (2004) on Cellcept and Tacrolimus, HTN, colitis and GERD biba from home for ams found to be septic secondary to UTI and anemic. Patient is a a 73 yo f pmhx polycystic kidney disease s/p transplant (2004) on Cellcept and Tacrolimus, HTN, colitis and GERD biba from home for ams found to be septic secondary to UTI and anemic.  Patient examined at bedside, vitals/medications/chart reviewed, case discussed with MICU attending Dr. Delgado.  At this time patient does not require MICU admission.        Addendum: 3/21/2018 14:34  Recalled by Dr. Waite and patient hypotensive SBPs in 80s.  At this time patient will be transferred to MICU at this time.

## 2018-03-21 NOTE — PROGRESS NOTE ADULT - ASSESSMENT
Renal transplant: LRRT '2004 at [facility] (pt states baseline Screat "less than 1.0")  ARF due to volume depletion==> improving with hydration  Metabolic acidosis  - IVF and bicarbonate  - transplant sonogram  - cont immunosuppressives for now; check CSA level  - trend labs    Anemia  - check Fe stores; add DICK  - monitor H/H   - consider PRBCs    RO: hypocalcemia  - check iPTH, Vit D and phos

## 2018-03-21 NOTE — PROCEDURE NOTE - NSPOSTCAREGUIDE_GEN_A_CORE
Instructed patient/caregiver regarding signs and symptoms of infection/Verbal/written post procedure instructions were given to patient/caregiver/Instructed patient/caregiver to follow-up with primary care physician/Keep the cast/splint/dressing clean and dry/Care for catheter as per unit/ICU protocols

## 2018-03-21 NOTE — CONSULT NOTE ADULT - PROBLEM SELECTOR RECOMMENDATION 5
Anemia of chronic disease.  Down trend of H&H, possibly dilutional.   No signs of active bleeding.   Suggest following up guiac stool. Anemia of chronic disease.  Down trend of H&H, possibly dilutional.   No signs of active bleeding.   2U PRBC ordered.    Suggest following up guiac stool.

## 2018-03-21 NOTE — CONSULT NOTE ADULT - PROBLEM SELECTOR RECOMMENDATION 4
On immunosuppression :Cellcept and tacrolimus  Suggest continuing immunosuppression therapy.  Patient unable to take care of self at home, given family living far away ([place]) suggest social work consult.

## 2018-03-21 NOTE — CONSULT NOTE ADULT - PROBLEM SELECTOR RECOMMENDATION 7
Complaining of right knee pain.   Suggest Ortho consult for evaluation. History of colitis, patient reports episode of diarrhea at home however patient has only been drinking ensure shakes at home.    Patient immunosuppressed and on long term ppx abx at home.    If diarrhea continues suggesting sending c. diff.

## 2018-03-21 NOTE — CONSULT NOTE ADULT - SUBJECTIVE AND OBJECTIVE BOX
Patient is a 72y old  Female who presents with a chief complaint of weakness (20 Mar 2018 10:34)      BRIEF HOSPITAL COURSE:   Patient is a a 71 yo f pmhx polycystic kidney disease s/p transplant () on Cellcept and Tacrolimus, HTN and GERD biba from home for ams.  As per patient she has not been feeling well for over a month, patient endorses that her weakness started about a month ago and has progressed to where she hasn't been able to walk for a little over a week.  Neighbor found her at home altered and covered in her own excretions and feces which prompted their call to 911.  Upon arrival to ED patient found to be hypotensive, john, and +UTI.  Patient given about 5 L fluid, started on ceftriaxone and nephrology consulted.        Events last 24 hours:   Patient continued to receive fluid overnight, was hypotensive to systolic of high 60s this am given another fluid bolus that increased sbp to 70s and found to have an H&H of 6.8 and 21.8 respectively (partially dilutional?).  Consult called for hypotension.  At this time patient endorses chills, gerd, general malaise, lower abdominal discomfort and bilateral lower extremity discomfort.       PAST MEDICAL & SURGICAL HISTORY:  Basal cell carcinoma  Renal transplant recipient  Essential hypertension  Polycystic kidney disease  S/P kidney transplant    Allergies  penicillin (Rash; Short breath; Urticaria; Hives)      FAMILY HISTORY:  No pertinent family history in first degree relatives      Review of Systems:  See HPI.       Medications:  cefTRIAXone Injectable. 1000 milliGRAM(s) IV Push every 24 hours  trimethoprim   80 mG/sulfamethoxazole 400 mG 1 Tablet(s) Oral at bedtime  chlordiazePOXIDE   Oral   chlordiazePOXIDE 50 milliGRAM(s) Oral every 8 hours  LORazepam   Injectable 1 milliGRAM(s) IV Push every 2 hours PRN  pantoprazole    Tablet 40 milliGRAM(s) Oral before breakfast  hydrocortisone sodium succinate Injectable 50 milliGRAM(s) IV Push every 8 hours  potassium chloride  10 mEq/100 mL IVPB 10 milliEquivalent(s) IV Intermittent every 1 hour  sodium bicarbonate  Infusion 0.207 mEq/kG/Hr IV Continuous <Continuous>  cycloSPORINE  , modified (GENGRAF) 75 milliGRAM(s) Oral two times a day  epoetin roseann Injectable 17734 Unit(s) SubCutaneous <User Schedule>  influenza   Vaccine 0.5 milliLiter(s) IntraMuscular once  mycophenolate mofetil 500 milliGRAM(s) Oral two times a day      ICU Vital Signs Last 24 Hrs  T(C): 35.2 (21 Mar 2018 11:50), Max: 36.8 (20 Mar 2018 15:27)  T(F): 95.3 (21 Mar 2018 11:50), Max: 98.3 (20 Mar 2018 15:27)  HR: 79 (21 Mar 2018 11:23) (74 - 96)  BP: 95/64 (21 Mar 2018 11:23) (67/42 - 95/64)  BP(mean): --  ABP: --  ABP(mean): --  RR: 21 (21 Mar 2018 05:07) (17 - 22)  SpO2: 100% (21 Mar 2018 09:50) (94% - 100%)    Vital Signs Last 24 Hrs  T(C): 35.2 (21 Mar 2018 11:50), Max: 36.8 (20 Mar 2018 15:27)  T(F): 95.3 (21 Mar 2018 11:50), Max: 98.3 (20 Mar 2018 15:27)  HR: 79 (21 Mar 2018 11:23) (74 - 96)  BP: 95/64 (21 Mar 2018 11:23) (67/42 - 95/64)  BP(mean): --  RR: 21 (21 Mar 2018 05:07) (17 - 22)  SpO2: 100% (21 Mar 2018 09:50) (94% - 100%)        I&O's Detail    20 Mar 2018 07:01  -  21 Mar 2018 07:00  --------------------------------------------------------  IN:  Total IN: 0 mL    OUT:    Indwelling Catheter - Urethral: 550 mL    Stool: 1 mL  Total OUT: 551 mL  Total NET: -551 mL      LABS:                        6.8    5.9   )-----------( 228      ( 21 Mar 2018 06:12 )             21.8     03-21    136  |  103  |  85.0<H>  ----------------------------<  116<H>  3.2<L>   |  14.0<L>  |  4.30<H>    Ca    7.0<L>      21 Mar 2018 06:12  Phos  5.6       Mg     1.6         TPro  7.0  /  Alb  3.1<L>  /  TBili  0.4  /  DBili  x   /  AST  27  /  ALT  19  /  AlkPhos  251<H>        CARDIAC MARKERS ( 21 Mar 2018 06:12 )  x     / x     / 395 U/L / x     / 6.7 ng/mL  CARDIAC MARKERS ( 20 Mar 2018 03:27 )  x     / x     / 328 U/L / x     / 6.1 ng/mL      CAPILLARY BLOOD GLUCOSE  POCT Blood Glucose.: 125 mg/dL (20 Mar 2018 01:08)      PT/INR - ( 20 Mar 2018 01:21 )   PT: 12.0 sec;   INR: 1.09 ratio    PTT - ( 20 Mar 2018 01:21 )  PTT:28.7 sec      Urinalysis Basic - ( 20 Mar 2018 05:42 )  Color: Brown / Appearance: Turbid / S.020 / pH: x  Gluc: x / Ketone: Trace  / Bili: Moderate / Urobili: Negative   Blood: x / Protein: 100 / Nitrite: Negative   Leuk Esterase: Moderate / RBC: 11-25 /HPF / WBC TNTC /HPF   Sq Epi: x / Non Sq Epi: Few / Bacteria: Moderate      CULTURES:  Culture Results:   >100,000 CFU/ml Gram Negative Rods Identification and susceptibility to  follow. ( @ 06:47)      Physical Examination:    General: Patient lying in bed, appears uncomfortable.     HEENT: NC/AT, Pupils equal, reactive to light.  Symmetric.    PULM: Symmetrical thorax movement upon respiration  Clear to auscultation bilaterally, no significant sputum production.    CVS: Regular rate and rhythm, no murmurs, rubs, or gallops appreciated    ABD: Soft, nondistended, +tender over right lower quadrant, normoactive bowel sounds, no masses    EXT: No edema, nontender    SKIN: Warm and well perfused, no rashes noted.    NEURO: Alert, oriented, interactive, nonfocal    RADIOLOGY: ***    CRITICAL CARE TIME SPENT: *** Patient is a 72y old  Female who presents with a chief complaint of weakness (20 Mar 2018 10:34)      BRIEF HOSPITAL COURSE:   Patient is a a 71 yo f pmhx polycystic kidney disease s/p transplant () on Cellcept and Tacrolimus, HTN, colitis and GERD biba from home for ams.  As per patient she has not been feeling well for over a month, patient endorses that her weakness started about a month ago and has progressed to where she hasn't been able to walk for a little over a week.  Neighbor found her at home altered and covered in her own excretions and feces which prompted their call to 911.  Upon arrival to ED patient found to be hypotensive, john, and +UTI.  Patient given about 5 L fluid, started on ceftriaxone and nephrology consulted.        Events last 24 hours:   Patient continued to receive fluid overnight, was hypotensive to systolic of high 60s this am given another fluid bolus that increased sbp to 70s and found to have an H&H of 6.8 and 21.8 respectively (partially dilutional?).  Consult called for hypotension.  At this time patient endorses chills, gerd, general malaise, lower abdominal discomfort and bilateral lower extremity discomfort.       PAST MEDICAL & SURGICAL HISTORY:  Basal cell carcinoma  Renal transplant recipient  Essential hypertension  Polycystic kidney disease  S/P kidney transplant    Allergies  penicillin (Rash; Short breath; Urticaria; Hives)      FAMILY HISTORY:  No pertinent family history in first degree relatives      Review of Systems:  See HPI.       Medications:  cefTRIAXone Injectable. 1000 milliGRAM(s) IV Push every 24 hours  trimethoprim   80 mG/sulfamethoxazole 400 mG 1 Tablet(s) Oral at bedtime  chlordiazePOXIDE   Oral   chlordiazePOXIDE 50 milliGRAM(s) Oral every 8 hours  LORazepam   Injectable 1 milliGRAM(s) IV Push every 2 hours PRN  pantoprazole    Tablet 40 milliGRAM(s) Oral before breakfast  hydrocortisone sodium succinate Injectable 50 milliGRAM(s) IV Push every 8 hours  potassium chloride  10 mEq/100 mL IVPB 10 milliEquivalent(s) IV Intermittent every 1 hour  sodium bicarbonate  Infusion 0.207 mEq/kG/Hr IV Continuous <Continuous>  cycloSPORINE  , modified (GENGRAF) 75 milliGRAM(s) Oral two times a day  epoetin roseann Injectable 04709 Unit(s) SubCutaneous <User Schedule>  influenza   Vaccine 0.5 milliLiter(s) IntraMuscular once  mycophenolate mofetil 500 milliGRAM(s) Oral two times a day      ICU Vital Signs Last 24 Hrs  T(C): 35.2 (21 Mar 2018 11:50), Max: 36.8 (20 Mar 2018 15:27)  T(F): 95.3 (21 Mar 2018 11:50), Max: 98.3 (20 Mar 2018 15:27)  HR: 79 (21 Mar 2018 11:23) (74 - 96)  BP: 95/64 (21 Mar 2018 11:23) (67/42 - 95/64)  BP(mean): --  ABP: --  ABP(mean): --  RR: 21 (21 Mar 2018 05:07) (17 - 22)  SpO2: 100% (21 Mar 2018 09:50) (94% - 100%)    Vital Signs Last 24 Hrs  T(C): 35.2 (21 Mar 2018 11:50), Max: 36.8 (20 Mar 2018 15:27)  T(F): 95.3 (21 Mar 2018 11:50), Max: 98.3 (20 Mar 2018 15:27)  HR: 79 (21 Mar 2018 11:23) (74 - 96)  BP: 95/64 (21 Mar 2018 11:23) (67/42 - 95/64)  BP(mean): --  RR: 21 (21 Mar 2018 05:07) (17 - )  SpO2: 100% (21 Mar 2018 09:50) (94% - 100%)      I&O's Detail    20 Mar 2018 07:01  -  21 Mar 2018 07:00  --------------------------------------------------------  IN:  Total IN: 0 mL    OUT:    Indwelling Catheter - Urethral: 550 mL    Stool: 1 mL  Total OUT: 551 mL  Total NET: -551 mL      LABS:                        6.8    5.9   )-----------( 228      ( 21 Mar 2018 06:12 )             21.8     03-21    136  |  103  |  85.0<H>  ----------------------------<  116<H>  3.2<L>   |  14.0<L>  |  4.30<H>    Ca    7.0<L>      21 Mar 2018 06:12  Phos  5.6     -  Mg     1.6     21    TPro  7.0  /  Alb  3.1<L>  /  TBili  0.4  /  DBili  x   /  AST  27  /  ALT  19  /  AlkPhos  251<H>        CARDIAC MARKERS ( 21 Mar 2018 06:12 )  x     / x     / 395 U/L / x     / 6.7 ng/mL  CARDIAC MARKERS ( 20 Mar 2018 03:27 )  x     / x     / 328 U/L / x     / 6.1 ng/mL      CAPILLARY BLOOD GLUCOSE  POCT Blood Glucose.: 125 mg/dL (20 Mar 2018 01:08)      PT/INR - ( 20 Mar 2018 01:21 )   PT: 12.0 sec;   INR: 1.09 ratio    PTT - ( 20 Mar 2018 01:21 )  PTT:28.7 sec      Urinalysis Basic - ( 20 Mar 2018 05:42 )  Color: Brown / Appearance: Turbid / S.020 / pH: x  Gluc: x / Ketone: Trace  / Bili: Moderate / Urobili: Negative   Blood: x / Protein: 100 / Nitrite: Negative   Leuk Esterase: Moderate / RBC: 11-25 /HPF / WBC TNTC /HPF   Sq Epi: x / Non Sq Epi: Few / Bacteria: Moderate      CULTURES:  Culture Results:   >100,000 CFU/ml Gram Negative Rods Identification and susceptibility to  follow. ( @ 06:47)      VITALS   BP: 96/67mmHg  HR: 79  RR: 15  sPO2: 100%    Physical Examination:    General: Patient lying in bed, appears uncomfortable.     HEENT: NC/AT, Pupils equal, reactive to light.  Symmetric.    PULM: Symmetrical thorax movement upon respiration  Clear to auscultation bilaterally, no significant sputum production.    CVS: Regular rate and rhythm, no murmurs, rubs, or gallops appreciated    ABD: Soft, nondistended, +tender over right lower quadrant, normoactive bowel sounds, no masses, Left lower extremity scar (renal transplant)    EXT: Bilateral Non-pitting lower extremity edema.  +tender right knee to palpation, nonerythematous, medial linear surgical scar over right knee.  Left knee unremarkable. DP pulses symmetrical.     SKIN: Warm and well perfused, no rashes noted.     NEURO: Alert and oriented to person, place and time.      RADIOLOGY:     < from: CT Head No Cont (18 @ 06:05) >  IMPRESSION:   No CT evidence of acute intracranial hemorrhage, brain edema, mass effect   or acute territorial infarct.  Small chronic subdural collection   overlying the right frontal parietal convexity and measures up to   approximately 5-7 mm in caliber and is isoattenuating to CSF.  Follow-up   MRI can be obtained as clinically warranted.     < end of copied text >    < from: Xray Chest 1 View AP/PA. (18 @ 01:21) >  Impression: No acute pulmonary disease.    < end of copied text > Patient is a 72y old  Female who presents with a chief complaint of weakness (20 Mar 2018 10:34)      BRIEF HOSPITAL COURSE:   Patient is a a 73 yo f pmhx polycystic kidney disease s/p transplant () on Cellcept and Tacrolimus, HTN, colitis and GERD biba from home for ams.  As per patient she has not been feeling well for over a month, patient endorses that her weakness started about a month ago and has progressed to where she hasn't been able to walk for a little over a week.  Patient endorses that she was shaking at home, called son who suggest she call 911.  EMS found patient altered and covered in her own excretions and feces.  Upon arrival to ED patient found to be hypotensive, john, and +UTI.  Patient given about 5 L fluid, started on ceftriaxone and nephrology consulted.        Events last 24 hours:   Patient continued to receive fluid overnight, was hypotensive to systolic of high 60s this am given another fluid bolus that increased sbp to 70s and found to have an H&H of 6.8 and 21.8 respectively (partially dilutional?).  Consult called for hypotension.  At this time patient endorses chills, gerd, general malaise, lower abdominal discomfort and bilateral lower extremity discomfort.       PAST MEDICAL & SURGICAL HISTORY:  Basal cell carcinoma  Renal transplant recipient  Essential hypertension  Polycystic kidney disease  S/P kidney transplant    Allergies  penicillin (Rash; Short breath; Urticaria; Hives)      FAMILY HISTORY:  No pertinent family history in first degree relatives      Review of Systems:  See HPI.       Medications:  cefTRIAXone Injectable. 1000 milliGRAM(s) IV Push every 24 hours  trimethoprim   80 mG/sulfamethoxazole 400 mG 1 Tablet(s) Oral at bedtime  chlordiazePOXIDE   Oral   chlordiazePOXIDE 50 milliGRAM(s) Oral every 8 hours  LORazepam   Injectable 1 milliGRAM(s) IV Push every 2 hours PRN  pantoprazole    Tablet 40 milliGRAM(s) Oral before breakfast  hydrocortisone sodium succinate Injectable 50 milliGRAM(s) IV Push every 8 hours  potassium chloride  10 mEq/100 mL IVPB 10 milliEquivalent(s) IV Intermittent every 1 hour  sodium bicarbonate  Infusion 0.207 mEq/kG/Hr IV Continuous <Continuous>  cycloSPORINE  , modified (GENGRAF) 75 milliGRAM(s) Oral two times a day  epoetin roseann Injectable 19957 Unit(s) SubCutaneous <User Schedule>  influenza   Vaccine 0.5 milliLiter(s) IntraMuscular once  mycophenolate mofetil 500 milliGRAM(s) Oral two times a day      ICU Vital Signs Last 24 Hrs  T(C): 35.2 (21 Mar 2018 11:50), Max: 36.8 (20 Mar 2018 15:27)  T(F): 95.3 (21 Mar 2018 11:50), Max: 98.3 (20 Mar 2018 15:27)  HR: 79 (21 Mar 2018 11:23) (74 - 96)  BP: 95/64 (21 Mar 2018 11:23) (67/42 - 95/64)  BP(mean): --  ABP: --  ABP(mean): --  RR: 21 (21 Mar 2018 05:07) (17 - 22)  SpO2: 100% (21 Mar 2018 09:50) (94% - 100%)    Vital Signs Last 24 Hrs  T(C): 35.2 (21 Mar 2018 11:50), Max: 36.8 (20 Mar 2018 15:27)  T(F): 95.3 (21 Mar 2018 11:50), Max: 98.3 (20 Mar 2018 15:27)  HR: 79 (21 Mar 2018 11:23) (74 - 96)  BP: 95/64 (21 Mar 2018 11:23) (67/42 - 95/64)  BP(mean): --  RR: 21 (21 Mar 2018 05:07) (17 - )  SpO2: 100% (21 Mar 2018 09:50) (94% - 100%)      I&O's Detail    20 Mar 2018 07:01  -  21 Mar 2018 07:00  --------------------------------------------------------  IN:  Total IN: 0 mL    OUT:    Indwelling Catheter - Urethral: 550 mL    Stool: 1 mL  Total OUT: 551 mL  Total NET: -551 mL      LABS:                        6.8    5.9   )-----------( 228      ( 21 Mar 2018 06:12 )             21.8     03-21    136  |  103  |  85.0<H>  ----------------------------<  116<H>  3.2<L>   |  14.0<L>  |  4.30<H>    Ca    7.0<L>      21 Mar 2018 06:12  Phos  5.6       Mg     1.6         TPro  7.0  /  Alb  3.1<L>  /  TBili  0.4  /  DBili  x   /  AST  27  /  ALT  19  /  AlkPhos  251<H>        CARDIAC MARKERS ( 21 Mar 2018 06:12 )  x     / x     / 395 U/L / x     / 6.7 ng/mL  CARDIAC MARKERS ( 20 Mar 2018 03:27 )  x     / x     / 328 U/L / x     / 6.1 ng/mL      CAPILLARY BLOOD GLUCOSE  POCT Blood Glucose.: 125 mg/dL (20 Mar 2018 01:08)      PT/INR - ( 20 Mar 2018 01:21 )   PT: 12.0 sec;   INR: 1.09 ratio    PTT - ( 20 Mar 2018 01:21 )  PTT:28.7 sec      Urinalysis Basic - ( 20 Mar 2018 05:42 )  Color: Brown / Appearance: Turbid / S.020 / pH: x  Gluc: x / Ketone: Trace  / Bili: Moderate / Urobili: Negative   Blood: x / Protein: 100 / Nitrite: Negative   Leuk Esterase: Moderate / RBC: 11-25 /HPF / WBC TNTC /HPF   Sq Epi: x / Non Sq Epi: Few / Bacteria: Moderate      CULTURES:  Culture Results:   >100,000 CFU/ml Gram Negative Rods Identification and susceptibility to  follow. ( @ 06:47)      VITALS   BP: 96/67mmHg  HR: 79  RR: 15  sPO2: 100%    Physical Examination:    General: Patient lying in bed, appears uncomfortable.     HEENT: NC/AT, Pupils equal, reactive to light.  Symmetric.    PULM: Symmetrical thorax movement upon respiration  Clear to auscultation bilaterally, no significant sputum production.    CVS: Regular rate and rhythm, no murmurs, rubs, or gallops appreciated    ABD: Soft, nondistended, +tender over right lower quadrant, normoactive bowel sounds, no masses, Left lower extremity scar (renal transplant)    EXT: Bilateral Non-pitting lower extremity edema.  +tender right knee to palpation, nonerythematous, medial linear surgical scar over right knee.  Left knee unremarkable. DP pulses symmetrical.     SKIN: Warm and well perfused, no rashes noted.     NEURO: Alert and oriented to person, place and time.      RADIOLOGY:     < from: CT Head No Cont (18 @ 06:05) >  IMPRESSION:   No CT evidence of acute intracranial hemorrhage, brain edema, mass effect   or acute territorial infarct.  Small chronic subdural collection   overlying the right frontal parietal convexity and measures up to   approximately 5-7 mm in caliber and is isoattenuating to CSF.  Follow-up   MRI can be obtained as clinically warranted.     < end of copied text >    < from: Xray Chest 1 View AP/PA. (18 @ 01:21) >  Impression: No acute pulmonary disease.    < end of copied text >

## 2018-03-21 NOTE — CONSULT NOTE ADULT - SUBJECTIVE AND OBJECTIVE BOX
[facility] [place] LIZ SCHWARZ MD FACP   BRANDON MEDRANO MD  Diplomates American Board of Internal Medicine and Infecctious Diseases  [phone]  [id number] MARIA G [id number]      HPI:  The patient is a 72 year old female with a history of APCKD  status post renal transplant in  at [facility] on anti-rejection meds, hypertension and a history of alcohol use who was brought to the ER by EMS for weakness. According to the patient about 1 month ago she was in her usual state of health when she developed episodes of diarrhea. She had persistent diarrhea and vomiting made worse by eating. She stopped eating and was only drinking ensure. She slowly lost her appetite and became very weak. She lives alone and had difficulty ambulating around her house. She continued to take all her medications. About a weak ago she felt so weak that she was unable to get up. S EMS found the patient laying on the floor in her urine. In the ER, noted to have john with metabolic acidosis, hyponatremia and hypomagnesemia.    PT WITH HYPOTHRMIA ON WARMING BLNKET AND HYPOTENSIVE  ASKED TO EVALUATE FROM ID STANDPOINT            PAST MEDICAL & SURGICAL HISTORY:  Basal cell carcinoma  Renal transplant recipient  Essential hypertension  Polycystic kidney disease  S/P kidney transplant      ANTIBIOTICS         Allergies    penicillin (Rash; Short breath; Urticaria; Hives)    Intolerances        SOCIAL HISTORY:    FAMILY HISTORY:  No pertinent family history in first degree relatives      Vital Signs Last 24 Hrs  T(C): 35.4 (21 Mar 2018 14:01), Max: 36.8 (20 Mar 2018 15:27)  T(F): 95.8 (21 Mar 2018 14:01), Max: 98.3 (20 Mar 2018 15:27)  HR: 83 (21 Mar 2018 14:01) (74 - 96)  BP: 87/63 (21 Mar 2018 14:01) (67/42 - 95/64)  BP(mean): --  RR: 20 (21 Mar 2018 14:01) (17 - 22)  SpO2: 96% (21 Mar 2018 14:01) (94% - 100%)  Drug Dosing Weight  Height (cm): 162.56 (20 Mar 2018 00:21)  Weight (kg): 72.6 (20 Mar 2018 00:21)  BMI (kg/m2): 27.5 (20 Mar 2018 00:21)  BSA (m2): 1.78 (20 Mar 2018 00:21)      REVIEW OF SYSTEMS:    CONSTITUTIONAL:  As per HPI.    HEENT:  Eyes:  No diplopia or blurred vision. ENT:  No earache, sore throat or runny nose.    CARDIOVASCULAR:  No pressure, squeezing, strangling, tightness, heaviness or aching about the chest, neck, axilla or epigastrium.    RESPIRATORY:  No cough, shortness of breath, PND or orthopnea.    GASTROINTESTINAL:  No nausea, vomiting or diarrhea.    GENITOURINARY:  No dysuria, frequency or urgency.    MUSCULOSKELETAL:  As per HPI.    SKIN:  No change in skin, hair or nails.    NEUROLOGIC:  No paresthesias, fasciculations, seizures or weakness.                  PHYSICAL EXAMINATION:    GENERAL: The patient is a  THIN FEMALE LETHARGIC  HEENT: Head is normocephalic and atraumatic.  ANICTERIC  NECK: Supple. No carotid bruits.  No lymphadenopathy or thyromegaly.    LUNGS:COARSE BREATH SOUNDS    HEART: Regular rate and rhythm without murmur.    ABDOMEN: Soft, nontender, and nondistended.  Positive bowel sounds.  No hepatosplenomegaly was noted. NO REBOUND NO GUARDING    EXTREMITIES: NO EDEMA NO ERYTHEMA    NEUROLOGIC: NON FOCAL      SKIN: No ulceration or induration present. NO RASH        BLOOD CULTURES  Culture Results:   >100,000 CFU/ml Gram Negative Rods Identification and susceptibility to  follow. ( @ 06:47)       URINE CX          LABS:                        6.9    x     )-----------( x        ( 21 Mar 2018 12:45 )             21.1         136  |  103  |  85.0<H>  ----------------------------<  116<H>  3.2<L>   |  14.0<L>  |  4.30<H>    Ca    7.0<L>      21 Mar 2018 06:12  Phos  5.6       Mg     1.6         TPro  7.0  /  Alb  3.1<L>  /  TBili  0.4  /  DBili  x   /  AST  27  /  ALT  19  /  AlkPhos  251<H>      PT/INR - ( 20 Mar 2018 01:21 )   PT: 12.0 sec;   INR: 1.09 ratio         PTT - ( 20 Mar 2018 01:21 )  PTT:28.7 sec  Urinalysis Basic - ( 20 Mar 2018 05:42 )    Color: Brown / Appearance: Turbid / S.020 / pH: x  Gluc: x / Ketone: Trace  / Bili: Moderate / Urobili: Negative   Blood: x / Protein: 100 / Nitrite: Negative   Leuk Esterase: Moderate / RBC: 11-25 /HPF / WBC TNTC /HPF   Sq Epi: x / Non Sq Epi: Few / Bacteria: Moderate        RADIOLOGY & ADDITIONAL STUDIES:      ASSESSMENT/PLAN  The patient is a 72 year old female with a history of APCKD  status post renal transplant in  at [facility] on anti-rejection meds, hypertension and a history of alcohol use who was brought to the ER by EMS for weakness. According to the patient about 1 month ago she was in her usual state of health when she developed episodes of diarrhea. She had persistent diarrhea and vomiting made worse by eating. She stopped eating and was only drinking ensure. She slowly lost her appetite and became very weak. She lives alone and had difficulty ambulating around her house. She continued to take all her medications. About a weak ago she felt so weak that she was unable to get up. S EMS found the patient laying on the floor in her urine. In the ER, noted to have john with metabolic acidosis, hyponatremia and hypomagnesemia.    PT WITH HYPOTHERMIA ON WARMING BLANKET AND HYPOTENSIVE  CONCERN FOR SEPSIS  HAS RECEIVED FLUID RESUSCITATION   WITH LOW BP SUGGEST TRANSFER TO ICU  ABG CHECK IF ACIDOTIC  BROAD SPECTRUM ABX                   BRANDON GARCIA MD

## 2018-03-21 NOTE — CONSULT NOTE ADULT - PROBLEM SELECTOR RECOMMENDATION 6
History of colitis, patient reports episode of diarrhea at home however patient has only been drinking ensure shakes at home.    Patient immunosuppressed and on long term ppx abx at home.    If diarrhea continues suggesting sending c. diff. History of etoh abuse.   Suggest monitoring for ETOH withdrawal.    CIWA protocol in place.

## 2018-03-22 DIAGNOSIS — N17.9 ACUTE KIDNEY FAILURE, UNSPECIFIED: ICD-10-CM

## 2018-03-22 DIAGNOSIS — D64.9 ANEMIA, UNSPECIFIED: ICD-10-CM

## 2018-03-22 DIAGNOSIS — I10 ESSENTIAL (PRIMARY) HYPERTENSION: ICD-10-CM

## 2018-03-22 LAB
-  AMIKACIN: SIGNIFICANT CHANGE UP
-  AMPICILLIN/SULBACTAM: SIGNIFICANT CHANGE UP
-  AMPICILLIN: SIGNIFICANT CHANGE UP
-  AZTREONAM: SIGNIFICANT CHANGE UP
-  CEFAZOLIN: SIGNIFICANT CHANGE UP
-  CEFEPIME: SIGNIFICANT CHANGE UP
-  CEFOXITIN: SIGNIFICANT CHANGE UP
-  CEFTRIAXONE: SIGNIFICANT CHANGE UP
-  CIPROFLOXACIN: SIGNIFICANT CHANGE UP
-  ERTAPENEM: SIGNIFICANT CHANGE UP
-  GENTAMICIN: SIGNIFICANT CHANGE UP
-  IMIPENEM: SIGNIFICANT CHANGE UP
-  LEVOFLOXACIN: SIGNIFICANT CHANGE UP
-  MEROPENEM: SIGNIFICANT CHANGE UP
-  NITROFURANTOIN: SIGNIFICANT CHANGE UP
-  PIPERACILLIN/TAZOBACTAM: SIGNIFICANT CHANGE UP
-  TIGECYCLINE: SIGNIFICANT CHANGE UP
-  TOBRAMYCIN: SIGNIFICANT CHANGE UP
-  TRIMETHOPRIM/SULFAMETHOXAZOLE: SIGNIFICANT CHANGE UP
24R-OH-CALCIDIOL SERPL-MCNC: 36.9 NG/ML — SIGNIFICANT CHANGE UP (ref 30–80)
ALBUMIN SERPL ELPH-MCNC: 2.2 G/DL — LOW (ref 3.3–5.2)
ALP SERPL-CCNC: 157 U/L — HIGH (ref 40–120)
ALT FLD-CCNC: 13 U/L — SIGNIFICANT CHANGE UP
ANION GAP SERPL CALC-SCNC: 18 MMOL/L — HIGH (ref 5–17)
AST SERPL-CCNC: 23 U/L — SIGNIFICANT CHANGE UP
BILIRUB SERPL-MCNC: 0.2 MG/DL — LOW (ref 0.4–2)
BUN SERPL-MCNC: 68 MG/DL — HIGH (ref 8–20)
CALCIUM SERPL-MCNC: 6.8 MG/DL — LOW (ref 8.4–10.5)
CALCIUM SERPL-MCNC: 6.8 MG/DL — LOW (ref 8.6–10.2)
CHLORIDE SERPL-SCNC: 101 MMOL/L — SIGNIFICANT CHANGE UP (ref 98–107)
CO2 SERPL-SCNC: 18 MMOL/L — LOW (ref 22–29)
CREAT SERPL-MCNC: 3.14 MG/DL — HIGH (ref 0.5–1.3)
CYCLOSPORINE SER-MCNC: 93 NG/ML — LOW (ref 150–400)
FERRITIN SERPL-MCNC: 1464 NG/ML — HIGH (ref 11–306)
GLUCOSE SERPL-MCNC: 164 MG/DL — HIGH (ref 70–115)
HCT VFR BLD CALC: 26.3 % — LOW (ref 37–47)
HGB BLD-MCNC: 8.5 G/DL — LOW (ref 12–16)
IRON SATN MFR SERPL: 68 % — HIGH (ref 14–50)
IRON SATN MFR SERPL: 90 UG/DL — SIGNIFICANT CHANGE UP (ref 37–145)
MAGNESIUM SERPL-MCNC: 1.2 MG/DL — LOW (ref 1.6–2.6)
MCHC RBC-ENTMCNC: 29.3 PG — SIGNIFICANT CHANGE UP (ref 27–31)
MCHC RBC-ENTMCNC: 32.3 G/DL — SIGNIFICANT CHANGE UP (ref 32–36)
MCV RBC AUTO: 90.7 FL — SIGNIFICANT CHANGE UP (ref 81–99)
METHOD TYPE: SIGNIFICANT CHANGE UP
PHOSPHATE SERPL-MCNC: 2.5 MG/DL — SIGNIFICANT CHANGE UP (ref 2.4–4.7)
PLATELET # BLD AUTO: 245 K/UL — SIGNIFICANT CHANGE UP (ref 150–400)
POTASSIUM SERPL-MCNC: 3.5 MMOL/L — SIGNIFICANT CHANGE UP (ref 3.5–5.3)
POTASSIUM SERPL-SCNC: 3.5 MMOL/L — SIGNIFICANT CHANGE UP (ref 3.5–5.3)
PROT SERPL-MCNC: 4.8 G/DL — LOW (ref 6.6–8.7)
RBC # BLD: 2.9 M/UL — LOW (ref 4.4–5.2)
RBC # FLD: 16.8 % — HIGH (ref 11–15.6)
SODIUM SERPL-SCNC: 137 MMOL/L — SIGNIFICANT CHANGE UP (ref 135–145)
T4 AB SER-ACNC: 3.1 UG/DL — LOW (ref 4.5–12)
TIBC SERPL-MCNC: 132 UG/DL — LOW (ref 220–430)
TRANSFERRIN SERPL-MCNC: 92 MG/DL — LOW (ref 192–382)
WBC # BLD: 4.6 K/UL — LOW (ref 4.8–10.8)
WBC # FLD AUTO: 4.6 K/UL — LOW (ref 4.8–10.8)

## 2018-03-22 PROCEDURE — 99233 SBSQ HOSP IP/OBS HIGH 50: CPT

## 2018-03-22 RX ORDER — ASPIRIN/CALCIUM CARB/MAGNESIUM 324 MG
1 TABLET ORAL
Qty: 0 | Refills: 0 | COMMUNITY

## 2018-03-22 RX ORDER — MYCOPHENOLATE MOFETIL 250 MG/1
1 CAPSULE ORAL
Qty: 0 | Refills: 0 | COMMUNITY

## 2018-03-22 RX ORDER — MAGNESIUM SULFATE 500 MG/ML
2 VIAL (ML) INJECTION ONCE
Qty: 0 | Refills: 0 | Status: COMPLETED | OUTPATIENT
Start: 2018-03-22 | End: 2018-03-22

## 2018-03-22 RX ORDER — POTASSIUM CHLORIDE 20 MEQ
40 PACKET (EA) ORAL ONCE
Qty: 0 | Refills: 0 | Status: COMPLETED | OUTPATIENT
Start: 2018-03-22 | End: 2018-03-22

## 2018-03-22 RX ORDER — METOPROLOL TARTRATE 50 MG
1 TABLET ORAL
Qty: 0 | Refills: 0 | COMMUNITY

## 2018-03-22 RX ORDER — CALCIUM GLUCONATE 100 MG/ML
1 VIAL (ML) INTRAVENOUS ONCE
Qty: 0 | Refills: 0 | Status: COMPLETED | OUTPATIENT
Start: 2018-03-22 | End: 2018-03-22

## 2018-03-22 RX ORDER — CALCITRIOL 0.5 UG/1
0.25 CAPSULE ORAL DAILY
Qty: 0 | Refills: 0 | Status: DISCONTINUED | OUTPATIENT
Start: 2018-03-22 | End: 2018-03-23

## 2018-03-22 RX ADMIN — Medication 100 MEQ/KG/HR: at 22:38

## 2018-03-22 RX ADMIN — CYCLOSPORINE 75 MILLIGRAM(S): 100 CAPSULE ORAL at 16:58

## 2018-03-22 RX ADMIN — Medication 103 MILLIGRAM(S): at 17:00

## 2018-03-22 RX ADMIN — Medication 200 GRAM(S): at 09:56

## 2018-03-22 RX ADMIN — Medication 40 MILLIEQUIVALENT(S): at 12:31

## 2018-03-22 RX ADMIN — Medication 50 MICROGRAM(S): at 06:06

## 2018-03-22 RX ADMIN — Medication 1 TABLET(S): at 22:37

## 2018-03-22 RX ADMIN — Medication 200 MILLIGRAM(S): at 17:00

## 2018-03-22 RX ADMIN — MYCOPHENOLATE MOFETIL 500 MILLIGRAM(S): 250 CAPSULE ORAL at 16:55

## 2018-03-22 RX ADMIN — Medication 100 MEQ/KG/HR: at 02:27

## 2018-03-22 RX ADMIN — Medication 103 MILLIGRAM(S): at 06:06

## 2018-03-22 RX ADMIN — CYCLOSPORINE 75 MILLIGRAM(S): 100 CAPSULE ORAL at 06:05

## 2018-03-22 RX ADMIN — MIDODRINE HYDROCHLORIDE 5 MILLIGRAM(S): 2.5 TABLET ORAL at 06:11

## 2018-03-22 RX ADMIN — MEROPENEM 100 MILLIGRAM(S): 1 INJECTION INTRAVENOUS at 06:05

## 2018-03-22 RX ADMIN — Medication 50 MILLIGRAM(S): at 06:11

## 2018-03-22 RX ADMIN — Medication 100 MEQ/KG/HR: at 13:15

## 2018-03-22 RX ADMIN — MEROPENEM 100 MILLIGRAM(S): 1 INJECTION INTRAVENOUS at 16:54

## 2018-03-22 RX ADMIN — Medication 103 MILLIGRAM(S): at 12:55

## 2018-03-22 RX ADMIN — Medication 50 MILLIGRAM(S): at 22:34

## 2018-03-22 RX ADMIN — Medication 50 GRAM(S): at 08:27

## 2018-03-22 RX ADMIN — Medication 200 MILLIGRAM(S): at 06:06

## 2018-03-22 RX ADMIN — MYCOPHENOLATE MOFETIL 500 MILLIGRAM(S): 250 CAPSULE ORAL at 06:05

## 2018-03-22 RX ADMIN — Medication 50 MILLIGRAM(S): at 13:15

## 2018-03-22 RX ADMIN — CALCITRIOL 0.25 MICROGRAM(S): 0.5 CAPSULE ORAL at 12:31

## 2018-03-22 RX ADMIN — PANTOPRAZOLE SODIUM 40 MILLIGRAM(S): 20 TABLET, DELAYED RELEASE ORAL at 06:12

## 2018-03-22 NOTE — PROGRESS NOTE ADULT - ASSESSMENT
Patient is a a 73 yo f pmhx polycystic kidney disease s/p transplant (2004) on Cellcept and Tacrolimus, HTN, colitis and GERD biba from home for ams found to be septic secondary to UTI and anemic. Patient is a a 71 yo f pmhx polycystic kidney disease s/p transplant (2004) on Cellcept and Tacrolimus, HTN, colitis and GERD biba from home for ams found to be septic secondary to UTI and anemic.     stable for transfer out of ICU will likely be transferred to [facility] for further care by transplant team once bed on floor available

## 2018-03-22 NOTE — PROGRESS NOTE ADULT - SUBJECTIVE AND OBJECTIVE BOX
Patient is a 72y old  Female who presents with a chief complaint of weakness (20 Mar 2018 10:34)      BRIEF HOSPITAL COURSE: 72F wih CRF S/P Kidney Transplant @ 12 years ago. + History of ETOH abuse, but apparently sober x 2 months. This past weekend was having increased confusion. She spoke to her sone on the phone, who though she was possibly having ETOH withdrawl, so he told her to "have a drink". In the following dauys she was found disheveled, confused, laying on her couch covered in urine and feces.  She was admitted and found to be severely dehydrated, with DERRICK, Creatnine of 5 and her BP was low. She was volume resuscitated, although her BP remained in the 80s. Her Hgb subsequently dropped to 6.8, without evidence of blood loss. She was transfused 2 PRBC with a appropriate response in her Hgb. Her BP also improved overnight.  Urine Culture was+ E. Coli and the IV Rocephin was continued.     The patient's transplant surgeon at [facility] was contacted, and they have agreed to take her at [facility] once she is able to transfer out of the ICU    PAST MEDICAL & SURGICAL HISTORY:  Basal cell carcinoma  Renal transplant recipient  Essential hypertension  Polycystic kidney disease  S/P kidney transplant      Review of Systems:  CONSTITUTIONAL: No fever, chills, or fatigue  EYES: No eye pain, visual disturbances, or discharge  ENMT:  No difficulty hearing, tinnitus, vertigo; No sinus or throat pain  NECK: No pain or stiffness  RESPIRATORY: No cough, wheezing, chills or hemoptysis; No shortness of breath  CARDIOVASCULAR: No chest pain, palpitations, dizziness, or leg swelling  GASTROINTESTINAL: No abdominal or epigastric pain. No nausea, vomiting, or hematemesis; No diarrhea or constipation. No melena or hematochezia.  GENITOURINARY: No dysuria, frequency, hematuria, or incontinence  NEUROLOGICAL: +confusion, + delerium  SKIN: No itching, burning, rashes, or lesions   MUSCULOSKELETAL: No joint pain or swelling; No muscle, back, or extremity pain  PSYCHIATRIC: No depression, anxiety, mood swings, or difficulty sleeping      Medications:  meropenem  IVPB 500 milliGRAM(s) IV Intermittent every 12 hours  trimethoprim   80 mG/sulfamethoxazole 400 mG 1 Tablet(s) Oral at bedtime  pantoprazole    Tablet 40 milliGRAM(s) Oral before breakfast  hydrocortisone sodium succinate Injectable 50 milliGRAM(s) IV Push every 8 hours  levothyroxine 50 MICROGram(s) Oral daily  ascorbic acid IVPB 1500 milliGRAM(s) IV Intermittent every 6 hours  calcitriol   Capsule 0.25 MICROGram(s) Oral daily  sodium bicarbonate  Infusion 0.207 mEq/kG/Hr IV Continuous <Continuous>  thiamine Injectable 200 milliGRAM(s) IV Push <User Schedule>  cycloSPORINE  , modified (GENGRAF) 75 milliGRAM(s) Oral two times a day  epoetin roseann Injectable 37101 Unit(s) SubCutaneous <User Schedule>  mycophenolate mofetil 500 milliGRAM(s) Oral two times a day              ICU Vital Signs Last 24 Hrs  T(C): 36.9 (22 Mar 2018 12:00), Max: 37.1 (22 Mar 2018 08:00)  T(F): 98.5 (22 Mar 2018 12:00), Max: 98.7 (22 Mar 2018 08:00)  HR: 99 (22 Mar 2018 14:00) (70 - 102)  BP: 101/55 (22 Mar 2018 14:00) (75/52 - 120/68)  BP(mean): 76 (22 Mar 2018 14:00) (58 - 90)  ABP: --  ABP(mean): --  RR: 26 (22 Mar 2018 14:00) (13 - 28)  SpO2: 99% (22 Mar 2018 14:00) (93% - 100%)          I&O's Detail    21 Mar 2018 07:01  -  22 Mar 2018 07:00  --------------------------------------------------------  IN:    Packed Red Blood Cells: 594 mL    sodium bicarbonate  Infusion: 1500 mL    Solution: 150 mL    Solution: 300 mL  Total IN: 2544 mL    OUT:    Indwelling Catheter - Urethral: 1470 mL  Total OUT: 1470 mL    Total NET: 1074 mL      22 Mar 2018 07:01  -  22 Mar 2018 14:19  --------------------------------------------------------  IN:    Oral Fluid: 240 mL    sodium bicarbonate  Infusion: 700 mL    Solution: 100 mL    Solution: 50 mL    Solution: 100 mL  Total IN: 1190 mL    OUT:    Indwelling Catheter - Urethral: 530 mL  Total OUT: 530 mL    Total NET: 660 mL            LABS:                        8.5    4.6   )-----------( 245      ( 22 Mar 2018 06:28 )             26.3     03-22    137  |  101  |  68.0<H>  ----------------------------<  164<H>  3.5   |  18.0<L>  |  3.14<H>    Ca    6.8<L>      22 Mar 2018 06:28  Phos  2.5     03-22  Mg     1.2     03-22    TPro  4.8<L>  /  Alb  2.2<L>  /  TBili  0.2<L>  /  DBili  x   /  AST  23  /  ALT  13  /  AlkPhos  157<H>  03-22      CARDIAC MARKERS ( 21 Mar 2018 06:12 )  x     / x     / 395 U/L / x     / 6.7 ng/mL      CAPILLARY BLOOD GLUCOSE            CULTURES:  Culture Results:   No growth at 48 hours (03-20-18 @ 09:05)  Culture Results:   No growth at 48 hours (03-20-18 @ 09:05)  Culture Results:   >100,000 CFU/ml Escherichia coli  10,000 - 49,000 CFU/mL Gram Negative Rods Identification and  susceptibility to follow. (03-20-18 @ 06:47)      Physical Examination:    General: No acute distress.  Alert, oriented, interactive, nonfocal    HEENT: Pupils equal, reactive to light.  Symmetric.    PULM: Clear to auscultation bilaterally, no significant sputum production    CVS: Regular rate and rhythm, no murmurs, rubs, or gallops    ABD: Soft, nondistended, nontender, normoactive bowel sounds, no masses    EXT: No edema, nontender    SKIN: Warm and well perfused, no rashes noted.    RADIOLOGY: Renal US: FINDINGS/  IMPRESSION:    Left iliac fossa renal transplant. Mild fullness of the collecting system.    Atrophic native kidneys with multiple cystic lesions in the left kidney.    Urinary bladder: Xiong catheter    Incidentally noted markedly distended gallbladder with gallbladder sludge.

## 2018-03-22 NOTE — PROGRESS NOTE ADULT - SUBJECTIVE AND OBJECTIVE BOX
NEPHROLOGY INTERVAL HPI/OVERNIGHT EVENTS:  Events of last night noted==> transfer to MICU for refractory hypotension  Now comfortable; BP better  no cp, sob, n/v/d    MEDICATIONS  (STANDING):  ascorbic acid IVPB 1500 milliGRAM(s) IV Intermittent every 6 hours  cycloSPORINE  , modified (GENGRAF) 75 milliGRAM(s) Oral two times a day  epoetin roseann Injectable 42345 Unit(s) SubCutaneous <User Schedule>  hydrocortisone sodium succinate Injectable 50 milliGRAM(s) IV Push every 8 hours  levothyroxine 50 MICROGram(s) Oral daily  magnesium sulfate  IVPB 2 Gram(s) IV Intermittent once  meropenem  IVPB 500 milliGRAM(s) IV Intermittent every 12 hours  mycophenolate mofetil 500 milliGRAM(s) Oral two times a day  pantoprazole    Tablet 40 milliGRAM(s) Oral before breakfast  sodium bicarbonate  Infusion 0.207 mEq/kG/Hr (100 mL/Hr) IV Continuous <Continuous>  thiamine Injectable 200 milliGRAM(s) IV Push <User Schedule>  trimethoprim   80 mG/sulfamethoxazole 400 mG 1 Tablet(s) Oral at bedtime    MEDICATIONS  (PRN):      Allergies    penicillin (Rash; Short breath; Urticaria; Hives)          Vital Signs Last 24 Hrs  T(C): 37.1 (22 Mar 2018 08:00), Max: 37.1 (22 Mar 2018 08:00)  T(F): 98.7 (22 Mar 2018 08:00), Max: 98.7 (22 Mar 2018 08:00)  HR: 74 (22 Mar 2018 10:00) (70 - 102)  BP: 100/59 (22 Mar 2018 10:00) (75/52 - 120/68)  BP(mean): 74 (22 Mar 2018 10:00) (58 - 90)  RR: 19 (22 Mar 2018 10:00) (13 - 28)  SpO2: 98% (22 Mar 2018 10:00) (93% - 100%)    PHYSICAL EXAM:  GENERAL: Frail, cachectic  HEAD:  Atraumatic, Normocephalic  EYES: EOMI  NECK: Supple, no jvd  NERVOUS SYSTEM:  Alert & Oriented X3   CHEST/LUNG: Clear; diminished BS at bases  HEART: Regular rate and rhythm; No rub  ABDOMEN: Soft, Nontender, Nondistended; Bowel sounds +  EXTREMITIES:  no edema    LABS:                        8.5    4.6   )-----------( 245      ( 22 Mar 2018 06:28 )             26.3     03-22    137  |  101  |  68.0<H>  ----------------------------<  164<H>  3.5   |  18.0<L>  |  3.14<H>    Ca    6.8<L>      22 Mar 2018 06:28  Phos  2.5     03-22  Mg     1.2     03-22    TPro  4.8<L>  /  Alb  2.2<L>  /  TBili  0.2<L>  /  DBili  x   /  AST  23  /  ALT  13  /  AlkPhos  157<H>  03-22    Culture Results:   >100,000 CFU/ml Escherichia coli  10,000 - 49,000 CFU/mL Gram Negative Rods Identification and  susceptibility to follow. (03.20.18 @ 06:47)    % Saturation, Iron: 68 % (03.22.18 @ 06:28)    Cyclosporine Level, Serum: 202: CYCLOSPORINE: Assay performed by Chemiluminescent Microparticle  Immunoassay (CMIA) on the [employer] system.  All immunoassay tests  are subject to rare interferences from heterophile antibodies so that if  atypical or unexpected results are obtained the lab should be notified to  confirm this result by an alternative method before adjusting medication  dosage. The therapeutic range of 150-400 ng/mL is based on trough levels  of Cyclosporine but levels for clinical management will vary depending on  the organ transplanted, post-dose time of draw, length of time  post-transplant and the individual patient's metabolism. ng/mL (03.21.18 @ 18:32)        Phosphorus Level, Serum: 2.5 mg/dL (03-22 @ 06:28)  Magnesium, Serum: 1.2 mg/dL (03-22 @ 06:28)  Intact PTH: 190 pg/mL (03-21 @ 18:32)  Vitamin D, 25-Hydroxy: 31.3 ng/mL (03-21 @ 18:32)      RADIOLOGY & ADDITIONAL TESTS:  < from: US Renal (03.21.18 @ 21:56) >     EXAM:  US KIDNEY(S)                          PROCEDURE DATE:  03/21/2018          INTERPRETATION:    Markedly distended gallbladder with gallbladder sludge.    CLINICAL INFORMATION: Acute renal failure    COMPARISON: None available.    TECHNIQUE: Sonography of the kidneys and bladder.     FINDINGS/  IMPRESSION:    Left iliac fossa renal transplant. Mild fullness of the collecting system.    Atrophic native kidneys with multiple cystic lesions in the left kidney.    Urinary bladder: Xiong catheter    Incidentally noted markedly distended gallbladder with gallbladder sludge.    < end of copied text >

## 2018-03-22 NOTE — PROGRESS NOTE ADULT - SUBJECTIVE AND OBJECTIVE BOX
AWILDA HANNON     Chief Complaint: Patient is a 72y old  Female who presents with a chief complaint of weakness (20 Mar 2018 10:34)      PAST MEDICAL & SURGICAL HISTORY:  Basal cell carcinoma  Renal transplant recipient  Essential hypertension  Polycystic kidney disease  S/P kidney transplant      HPI/OVERNIGHT EVENTS: Patient lying in reclining chair, complaining of position. I changed her position and she felt better.    MEDICATIONS  (STANDING):  ascorbic acid IVPB 1500 milliGRAM(s) IV Intermittent every 6 hours  calcitriol   Capsule 0.25 MICROGram(s) Oral daily  cycloSPORINE  , modified (GENGRAF) 75 milliGRAM(s) Oral two times a day  epoetin roseann Injectable 25019 Unit(s) SubCutaneous <User Schedule>  hydrocortisone sodium succinate Injectable 50 milliGRAM(s) IV Push every 8 hours  levothyroxine 50 MICROGram(s) Oral daily  meropenem  IVPB 500 milliGRAM(s) IV Intermittent every 12 hours  mycophenolate mofetil 500 milliGRAM(s) Oral two times a day  pantoprazole    Tablet 40 milliGRAM(s) Oral before breakfast  sodium bicarbonate  Infusion 0.207 mEq/kG/Hr (100 mL/Hr) IV Continuous <Continuous>  thiamine Injectable 200 milliGRAM(s) IV Push <User Schedule>  trimethoprim   80 mG/sulfamethoxazole 400 mG 1 Tablet(s) Oral at bedtime      Vital Signs Last 24 Hrs  T(C): 36.5 (22 Mar 2018 15:59), Max: 37.1 (22 Mar 2018 08:00)  T(F): 97.7 (22 Mar 2018 15:59), Max: 98.7 (22 Mar 2018 08:00)  HR: 74 (22 Mar 2018 16:00) (70 - 99)  BP: 116/53 (22 Mar 2018 16:00) (77/47 - 120/68)  BP(mean): 77 (22 Mar 2018 16:00) (58 - 90)  RR: 15 (22 Mar 2018 16:00) (13 - 28)  SpO2: 99% (22 Mar 2018 16:00) (93% - 100%)    PHYSICAL EXAM:  Constitutional: NAD, well-groomed, well-developed  HEENT: PERRLA, EOMI, Normal Hearing, MMM  Neck: No LAD, No JVD  Back: Normal spine flexure, No CVA tenderness  Respiratory: CTAB Cardiovascular: S1 and S2, RRR, no M/G/R  Gastrointestinal: BS+, soft, NT/ND  Extremities: No peripheral edema  Vascular: 2+ peripheral pulses  Neurological: A/O x 3,     CAPILLARY BLOOD GLUCOSE    LABS:                        8.5    4.6   )-----------( 245      ( 22 Mar 2018 06:28 )             26.3     03-22    137  |  101  |  68.0<H>  ----------------------------<  164<H>  3.5   |  18.0<L>  |  3.14<H>    Ca    6.8<L>      22 Mar 2018 06:28  Phos  2.5     03-22  Mg     1.2     03-22    TPro  4.8<L>  /  Alb  2.2<L>  /  TBili  0.2<L>  /  DBili  x   /  AST  23  /  ALT  13  /  AlkPhos  157<H>  03-22          RADIOLOGY & ADDITIONAL TESTS:

## 2018-03-22 NOTE — PROGRESS NOTE ADULT - SUBJECTIVE AND OBJECTIVE BOX
Patient is a 72y old  Female who presents with a chief complaint of weakness (20 Mar 2018 10:34)      BRIEF HOSPITAL COURSE: The patient is a 72 year old female with a history of APCKD  status post renal transplant in 2004 at [facility] on anti-rejection meds, hypertension and a history of alcohol use who was brought to the ER by EMS for weakness.  Admitted for sepsis and acute renal failure.     Events last 24 hours: Better s/p blood transfusions yesterday BP stable    PAST MEDICAL & SURGICAL HISTORY:  Basal cell carcinoma  Renal transplant recipient  Essential hypertension  Polycystic kidney disease  S/P kidney transplant      Review of Systems:  CONSTITUTIONAL: No fever, chills, or fatigue  EYES: No eye pain, visual disturbances, or discharge  ENMT:  No difficulty hearing, tinnitus, vertigo; No sinus or throat pain  NECK: No pain or stiffness  RESPIRATORY: No cough, wheezing, chills or hemoptysis; No shortness of breath  CARDIOVASCULAR: No chest pain, palpitations, dizziness, or leg swelling  GASTROINTESTINAL: No abdominal or epigastric pain. No nausea, vomiting, or hematemesis; No diarrhea or constipation. No melena or hematochezia.  GENITOURINARY: No dysuria, frequency, hematuria, or incontinence  NEUROLOGICAL: No headaches, memory loss, loss of strength, numbness, or tremors  SKIN: No itching, burning, rashes, or lesions   MUSCULOSKELETAL: fatigue  PSYCHIATRIC: No depression, anxiety, mood swings, or difficulty sleeping      Medications:  meropenem  IVPB 500 milliGRAM(s) IV Intermittent every 12 hours  trimethoprim   80 mG/sulfamethoxazole 400 mG 1 Tablet(s) Oral at bedtime  pantoprazole    Tablet 40 milliGRAM(s) Oral before breakfast  hydrocortisone sodium succinate Injectable 50 milliGRAM(s) IV Push every 8 hours  levothyroxine 50 MICROGram(s) Oral daily    ascorbic acid IVPB 1500 milliGRAM(s) IV Intermittent every 6 hours  magnesium sulfate  IVPB 2 Gram(s) IV Intermittent once  sodium bicarbonate  Infusion 0.207 mEq/kG/Hr IV Continuous <Continuous>  thiamine Injectable 200 milliGRAM(s) IV Push <User Schedule>    cycloSPORINE  , modified (GENGRAF) 75 milliGRAM(s) Oral two times a day  epoetin roseann Injectable 74781 Unit(s) SubCutaneous <User Schedule>  mycophenolate mofetil 500 milliGRAM(s) Oral two times a day              ICU Vital Signs Last 24 Hrs  T(C): 37.1 (22 Mar 2018 08:00), Max: 37.1 (22 Mar 2018 08:00)  T(F): 98.7 (22 Mar 2018 08:00), Max: 98.7 (22 Mar 2018 08:00)  HR: 74 (22 Mar 2018 10:00) (70 - 102)  BP: 100/59 (22 Mar 2018 10:00) (75/52 - 120/68)  BP(mean): 74 (22 Mar 2018 10:00) (58 - 90)  ABP: --  ABP(mean): --  RR: 19 (22 Mar 2018 10:00) (13 - 28)  SpO2: 98% (22 Mar 2018 10:00) (93% - 100%)          I&O's Detail    21 Mar 2018 07:01  -  22 Mar 2018 07:00  --------------------------------------------------------  IN:    Packed Red Blood Cells: 594 mL    sodium bicarbonate  Infusion: 1500 mL    Solution: 150 mL    Solution: 300 mL  Total IN: 2544 mL    OUT:    Indwelling Catheter - Urethral: 1470 mL  Total OUT: 1470 mL    Total NET: 1074 mL      22 Mar 2018 07:01  -  22 Mar 2018 10:24  --------------------------------------------------------  IN:    Oral Fluid: 120 mL    sodium bicarbonate  Infusion: 300 mL    Solution: 100 mL    Solution: 50 mL  Total IN: 570 mL    OUT:    Indwelling Catheter - Urethral: 225 mL  Total OUT: 225 mL    Total NET: 345 mL    LABS:                        8.5    4.6   )-----------( 245      ( 22 Mar 2018 06:28 )             26.3     03-22    137  |  101  |  68.0<H>  ----------------------------<  164<H>  3.5   |  18.0<L>  |  3.14<H>    Ca    6.8<L>      22 Mar 2018 06:28  Phos  2.5     03-22  Mg     1.2     03-22    TPro  4.8<L>  /  Alb  2.2<L>  /  TBili  0.2<L>  /  DBili  x   /  AST  23  /  ALT  13  /  AlkPhos  157<H>  03-22      CARDIAC MARKERS ( 21 Mar 2018 06:12 )  x     / x     / 395 U/L / x     / 6.7 ng/mL      CAPILLARY BLOOD GLUCOSE            CULTURES:  Culture Results:   >100,000 CFU/ml Escherichia coli  10,000 - 49,000 CFU/mL Gram Negative Rods Identification and  susceptibility to follow. (03-20-18 @ 06:47)      Physical Examination:    General: No acute distress.  Alert, oriented, interactive, nonfocal    HEENT: Pupils equal, reactive to light.  Symmetric.    PULM: Clear to auscultation bilaterally, no significant sputum production    CVS: Regular rate and rhythm, no murmurs, rubs, or gallops    ABD: Soft, nondistended, nontender, normoactive bowel sounds, no masses    EXT: No edema, nontender    SKIN: Warm and well perfused, no rashes noted.    RADIOLOGY: ***    CRITICAL CARE TIME SPENT: ***

## 2018-03-22 NOTE — CHART NOTE - NSCHARTNOTEFT_GEN_A_CORE
Upon Nutritional Assessment by the Registered Dietitian your patient was determined to meet criteria / has evidence of the following diagnosis/diagnoses:          [ ]  Mild Protein Calorie Malnutrition        [ x]  Moderate Protein Calorie Malnutrition        [ ] Severe Protein Calorie Malnutrition        [ ] Unspecified Protein Calorie Malnutrition        [ ] Underweight / BMI <19        [ ] Morbid Obesity / BMI > 40      Findings as based on:  •  Comprehensive nutrition assessment and consultation  •  Calorie counts (nutrient intake analysis)  •  Food acceptance and intake status from observations by staff  •  Follow up  •  Patient education  •  Intervention secondary to interdisciplinary rounds  •   concerns      Treatment:    The following diet has been recommended:  --liberalize diet to Regular. Pt refusing supplements at this time.       PROVIDER Section:     By signing this assessment you are acknowledging and agree with the diagnosis/diagnoses assigned by the Registered Dietitian    Comments:

## 2018-03-22 NOTE — PROGRESS NOTE ADULT - SUBJECTIVE AND OBJECTIVE BOX
AWILDA HANNON is a 72y Female with HPI:   72 year old female with a history of APCKD  status post renal transplant in 2004 at [facility] on anti-rejection meds, hypertension and a history of alcohol use who was brought to the ER by EMS for weakness. According to the patient about 1 month ago she was in her usual state of health when she developed episodes of diarrhea. She had persistent diarrhea and vomiting made worse by eating. She stopped eating and was only drinking ensure. She slowly lost her appetite and became very weak. She lives alone and had difficulty ambulating around her house. She continued to take all her medications. About a weak ago she felt so weak that she was unable to get up. S EMS found the patient laying on the floor in her urine. In the ER, noted to have john with metabolic acidosis, hyponatremia and hypomagnesemia.    PT TRANSFERRED YESTERDAY  TO ICU WITH  HYPOTHERMIA ON WARMING BLANKET AND HYPOTENSIVE  NOW AWAKE ALERT IMPROVED        Allergies:  penicillin (Rash; Short breath; Urticaria; Hives)      Medications:  ascorbic acid IVPB 1500 milliGRAM(s) IV Intermittent every 6 hours  calcitriol   Capsule 0.25 MICROGram(s) Oral daily  cycloSPORINE  , modified (GENGRAF) 75 milliGRAM(s) Oral two times a day  epoetin roseann Injectable 91540 Unit(s) SubCutaneous <User Schedule>  hydrocortisone sodium succinate Injectable 50 milliGRAM(s) IV Push every 8 hours  levothyroxine 50 MICROGram(s) Oral daily  meropenem  IVPB 500 milliGRAM(s) IV Intermittent every 12 hours  mycophenolate mofetil 500 milliGRAM(s) Oral two times a day  pantoprazole    Tablet 40 milliGRAM(s) Oral before breakfast  potassium chloride    Tablet ER 40 milliEquivalent(s) Oral once  sodium bicarbonate  Infusion 0.207 mEq/kG/Hr IV Continuous <Continuous>  thiamine Injectable 200 milliGRAM(s) IV Push <User Schedule>  trimethoprim   80 mG/sulfamethoxazole 400 mG 1 Tablet(s) Oral at bedtime      ANTIBIOTICS:         Review of Systems: - Negative except as mentioned above     Physical Exam:  ICU Vital Signs Last 24 Hrs  T(C): 37.1 (22 Mar 2018 08:00), Max: 37.1 (22 Mar 2018 08:00)  T(F): 98.7 (22 Mar 2018 08:00), Max: 98.7 (22 Mar 2018 08:00)  HR: 72 (22 Mar 2018 11:00) (70 - 102)  BP: 101/59 (22 Mar 2018 11:00) (75/52 - 120/68)  BP(mean): 76 (22 Mar 2018 11:00) (58 - 90)  ABP: --  ABP(mean): --  RR: 17 (22 Mar 2018 11:00) (13 - 28)  SpO2: 95% (22 Mar 2018 11:00) (93% - 100%)    GEN: NAD, pleasant  HEENT: normocephalic and atraumatic. EOMI. WENDY...  NECK: Supple. No carotid bruits.  No lymphadenopathy or thyromegaly.  LUNGS: Clear to auscultation.  HEART: Regular rate and rhythm without murmur.  ABDOMEN: Soft, nontender, and nondistended.  Positive bowel sounds.  No hepatosplenomegaly was noted.  NO REBOUND NO GUARDING  EXTREMITIES: Without any cyanosis, clubbing, rash, lesions or edema.  NEUROLOGIC: Cranial nerves II through XII are grossly intact.    SKIN: No ulceration or induration present.      Labs:

## 2018-03-22 NOTE — PROGRESS NOTE ADULT - ASSESSMENT
Renal transplant: LRRT '2004 at [facility]   ARF due to volume depletion, possible ischemic ATN  Metabolic acidosis==> Ecoli urosepsis  Hemodynamics improved; non oliguric and recovering renal function  Transplant sonogram w/o obstruction  -  cont IVF and bicarbonate  - supplement K+ and Mg+  - cont immunosuppressives for now==> CSA level acceptable  - trend labs    Anemia: Fe stores ok  - added DICK  - monitor H/H   - consider PRBCs if needed    RO: hypocalcemia (corrected= 8.2)  - add rocaltrol

## 2018-03-22 NOTE — DIETITIAN INITIAL EVALUATION ADULT. - NUTRITIONGOAL OUTCOME1
Pt will consume >50% at meals and consume supplement 1-2 times daily Pt will consume >75% at meals to meet estimated needs and improve nutrition status.

## 2018-03-22 NOTE — DIETITIAN INITIAL EVALUATION ADULT. - OTHER INFO
Pt admitted with sepsis secondary to UTI. Pt had been lying in her own feces/urine for about 1 week pta. Decreased po intake x 1 month pta, even less in the past 1 week pta. Pt admitted with sepsis secondary to UTI. Pt had been lying in her own feces/urine for about 1 week pta. Decreased po intake x 1 month pta, even less in the past 1 week pta. Pt refusing supplements at this time, encouraged pt to ask for food preferences to improve po intake. Will monitor.

## 2018-03-22 NOTE — DIETITIAN INITIAL EVALUATION ADULT. - ORAL INTAKE PTA
Pt with persistently decreasing appetite over the past month, only taking Ensure. Pt normally eats at least 2 meals/daily/poor

## 2018-03-23 VITALS
HEART RATE: 75 BPM | TEMPERATURE: 98 F | DIASTOLIC BLOOD PRESSURE: 73 MMHG | SYSTOLIC BLOOD PRESSURE: 112 MMHG | RESPIRATION RATE: 18 BRPM

## 2018-03-23 LAB
-  AMIKACIN: SIGNIFICANT CHANGE UP
-  AMPICILLIN/SULBACTAM: SIGNIFICANT CHANGE UP
-  AMPICILLIN: SIGNIFICANT CHANGE UP
-  AZTREONAM: SIGNIFICANT CHANGE UP
-  CEFAZOLIN: SIGNIFICANT CHANGE UP
-  CEFEPIME: SIGNIFICANT CHANGE UP
-  CEFOXITIN: SIGNIFICANT CHANGE UP
-  CEFTRIAXONE: SIGNIFICANT CHANGE UP
-  CIPROFLOXACIN: SIGNIFICANT CHANGE UP
-  ERTAPENEM: SIGNIFICANT CHANGE UP
-  GENTAMICIN: SIGNIFICANT CHANGE UP
-  IMIPENEM: SIGNIFICANT CHANGE UP
-  LEVOFLOXACIN: SIGNIFICANT CHANGE UP
-  MEROPENEM: SIGNIFICANT CHANGE UP
-  NITROFURANTOIN: SIGNIFICANT CHANGE UP
-  PIPERACILLIN/TAZOBACTAM: SIGNIFICANT CHANGE UP
-  TIGECYCLINE: SIGNIFICANT CHANGE UP
-  TOBRAMYCIN: SIGNIFICANT CHANGE UP
-  TRIMETHOPRIM/SULFAMETHOXAZOLE: SIGNIFICANT CHANGE UP
ANION GAP SERPL CALC-SCNC: 14 MMOL/L — SIGNIFICANT CHANGE UP (ref 5–17)
BUN SERPL-MCNC: 57 MG/DL — HIGH (ref 8–20)
CALCIUM SERPL-MCNC: 6.7 MG/DL — LOW (ref 8.6–10.2)
CHLORIDE SERPL-SCNC: 99 MMOL/L — SIGNIFICANT CHANGE UP (ref 98–107)
CO2 SERPL-SCNC: 27 MMOL/L — SIGNIFICANT CHANGE UP (ref 22–29)
CREAT SERPL-MCNC: 2.68 MG/DL — HIGH (ref 0.5–1.3)
CULTURE RESULTS: SIGNIFICANT CHANGE UP
GLUCOSE SERPL-MCNC: 134 MG/DL — HIGH (ref 70–115)
HCT VFR BLD CALC: 25 % — LOW (ref 37–47)
HGB BLD-MCNC: 8.2 G/DL — LOW (ref 12–16)
MAGNESIUM SERPL-MCNC: 1.4 MG/DL — LOW (ref 1.6–2.6)
MCHC RBC-ENTMCNC: 30.1 PG — SIGNIFICANT CHANGE UP (ref 27–31)
MCHC RBC-ENTMCNC: 32.8 G/DL — SIGNIFICANT CHANGE UP (ref 32–36)
MCV RBC AUTO: 91.9 FL — SIGNIFICANT CHANGE UP (ref 81–99)
METHOD TYPE: SIGNIFICANT CHANGE UP
OB PNL STL: NEGATIVE — SIGNIFICANT CHANGE UP
ORGANISM # SPEC MICROSCOPIC CNT: SIGNIFICANT CHANGE UP
PHOSPHATE SERPL-MCNC: 1.7 MG/DL — LOW (ref 2.4–4.7)
PLATELET # BLD AUTO: 263 K/UL — SIGNIFICANT CHANGE UP (ref 150–400)
POTASSIUM SERPL-MCNC: 3.4 MMOL/L — LOW (ref 3.5–5.3)
POTASSIUM SERPL-SCNC: 3.4 MMOL/L — LOW (ref 3.5–5.3)
PTH-INTACT FLD-MCNC: 250 PG/ML — HIGH (ref 15–65)
RBC # BLD: 2.72 M/UL — LOW (ref 4.4–5.2)
RBC # FLD: 16.8 % — HIGH (ref 11–15.6)
SODIUM SERPL-SCNC: 140 MMOL/L — SIGNIFICANT CHANGE UP (ref 135–145)
SPECIMEN SOURCE: SIGNIFICANT CHANGE UP
WBC # BLD: 4.4 K/UL — LOW (ref 4.8–10.8)
WBC # FLD AUTO: 4.4 K/UL — LOW (ref 4.8–10.8)

## 2018-03-23 PROCEDURE — 80053 COMPREHEN METABOLIC PANEL: CPT

## 2018-03-23 PROCEDURE — 81001 URINALYSIS AUTO W/SCOPE: CPT

## 2018-03-23 PROCEDURE — 93306 TTE W/DOPPLER COMPLETE: CPT

## 2018-03-23 PROCEDURE — 82272 OCCULT BLD FECES 1-3 TESTS: CPT

## 2018-03-23 PROCEDURE — 36430 TRANSFUSION BLD/BLD COMPNT: CPT

## 2018-03-23 PROCEDURE — 84100 ASSAY OF PHOSPHORUS: CPT

## 2018-03-23 PROCEDURE — 82803 BLOOD GASES ANY COMBINATION: CPT

## 2018-03-23 PROCEDURE — 84436 ASSAY OF TOTAL THYROXINE: CPT

## 2018-03-23 PROCEDURE — 83605 ASSAY OF LACTIC ACID: CPT

## 2018-03-23 PROCEDURE — 84480 ASSAY TRIIODOTHYRONINE (T3): CPT

## 2018-03-23 PROCEDURE — 86923 COMPATIBILITY TEST ELECTRIC: CPT

## 2018-03-23 PROCEDURE — 82947 ASSAY GLUCOSE BLOOD QUANT: CPT

## 2018-03-23 PROCEDURE — 82330 ASSAY OF CALCIUM: CPT

## 2018-03-23 PROCEDURE — 82746 ASSAY OF FOLIC ACID SERUM: CPT

## 2018-03-23 PROCEDURE — 82962 GLUCOSE BLOOD TEST: CPT

## 2018-03-23 PROCEDURE — 80048 BASIC METABOLIC PNL TOTAL CA: CPT

## 2018-03-23 PROCEDURE — 80307 DRUG TEST PRSMV CHEM ANLYZR: CPT

## 2018-03-23 PROCEDURE — 36415 COLL VENOUS BLD VENIPUNCTURE: CPT

## 2018-03-23 PROCEDURE — 99285 EMERGENCY DEPT VISIT HI MDM: CPT | Mod: 25

## 2018-03-23 PROCEDURE — P9016: CPT

## 2018-03-23 PROCEDURE — 76775 US EXAM ABDO BACK WALL LIM: CPT

## 2018-03-23 PROCEDURE — 86850 RBC ANTIBODY SCREEN: CPT

## 2018-03-23 PROCEDURE — 85610 PROTHROMBIN TIME: CPT

## 2018-03-23 PROCEDURE — 84132 ASSAY OF SERUM POTASSIUM: CPT

## 2018-03-23 PROCEDURE — 93005 ELECTROCARDIOGRAM TRACING: CPT

## 2018-03-23 PROCEDURE — 85014 HEMATOCRIT: CPT

## 2018-03-23 PROCEDURE — 84295 ASSAY OF SERUM SODIUM: CPT

## 2018-03-23 PROCEDURE — 85027 COMPLETE CBC AUTOMATED: CPT

## 2018-03-23 PROCEDURE — 71045 X-RAY EXAM CHEST 1 VIEW: CPT

## 2018-03-23 PROCEDURE — 84443 ASSAY THYROID STIM HORMONE: CPT

## 2018-03-23 PROCEDURE — 97163 PT EVAL HIGH COMPLEX 45 MIN: CPT

## 2018-03-23 PROCEDURE — 70450 CT HEAD/BRAIN W/O DYE: CPT

## 2018-03-23 PROCEDURE — 83690 ASSAY OF LIPASE: CPT

## 2018-03-23 PROCEDURE — 87040 BLOOD CULTURE FOR BACTERIA: CPT

## 2018-03-23 PROCEDURE — 87086 URINE CULTURE/COLONY COUNT: CPT

## 2018-03-23 PROCEDURE — 82607 VITAMIN B-12: CPT

## 2018-03-23 PROCEDURE — 82553 CREATINE MB FRACTION: CPT

## 2018-03-23 PROCEDURE — 82550 ASSAY OF CK (CPK): CPT

## 2018-03-23 PROCEDURE — 86901 BLOOD TYPING SEROLOGIC RH(D): CPT

## 2018-03-23 PROCEDURE — 83540 ASSAY OF IRON: CPT

## 2018-03-23 PROCEDURE — 82306 VITAMIN D 25 HYDROXY: CPT

## 2018-03-23 PROCEDURE — 96374 THER/PROPH/DIAG INJ IV PUSH: CPT

## 2018-03-23 PROCEDURE — 80158 DRUG ASSAY CYCLOSPORINE: CPT

## 2018-03-23 PROCEDURE — 82435 ASSAY OF BLOOD CHLORIDE: CPT

## 2018-03-23 PROCEDURE — 86900 BLOOD TYPING SEROLOGIC ABO: CPT

## 2018-03-23 PROCEDURE — 83735 ASSAY OF MAGNESIUM: CPT

## 2018-03-23 PROCEDURE — 82310 ASSAY OF CALCIUM: CPT

## 2018-03-23 PROCEDURE — 83970 ASSAY OF PARATHORMONE: CPT

## 2018-03-23 PROCEDURE — 84466 ASSAY OF TRANSFERRIN: CPT

## 2018-03-23 PROCEDURE — 85730 THROMBOPLASTIN TIME PARTIAL: CPT

## 2018-03-23 PROCEDURE — 83550 IRON BINDING TEST: CPT

## 2018-03-23 PROCEDURE — 85018 HEMOGLOBIN: CPT

## 2018-03-23 PROCEDURE — 87186 SC STD MICRODIL/AGAR DIL: CPT

## 2018-03-23 PROCEDURE — 99233 SBSQ HOSP IP/OBS HIGH 50: CPT

## 2018-03-23 PROCEDURE — 82728 ASSAY OF FERRITIN: CPT

## 2018-03-23 RX ORDER — CYCLOSPORINE 100 MG/1
3 CAPSULE ORAL
Qty: 0 | Refills: 0 | COMMUNITY

## 2018-03-23 RX ORDER — CALCIUM CARBONATE 500(1250)
1 TABLET ORAL THREE TIMES A DAY
Qty: 0 | Refills: 0 | Status: DISCONTINUED | OUTPATIENT
Start: 2018-03-23 | End: 2018-03-23

## 2018-03-23 RX ORDER — LEVOTHYROXINE SODIUM 125 MCG
1 TABLET ORAL
Qty: 0 | Refills: 0 | COMMUNITY
Start: 2018-03-23

## 2018-03-23 RX ORDER — CALCIUM CARBONATE 500(1250)
1 TABLET ORAL
Qty: 0 | Refills: 0 | COMMUNITY
Start: 2018-03-23

## 2018-03-23 RX ORDER — CALCIUM GLUCONATE 100 MG/ML
2 VIAL (ML) INTRAVENOUS ONCE
Qty: 0 | Refills: 0 | Status: COMPLETED | OUTPATIENT
Start: 2018-03-23 | End: 2018-03-23

## 2018-03-23 RX ORDER — CYCLOSPORINE 100 MG/1
3 CAPSULE ORAL
Qty: 0 | Refills: 0 | COMMUNITY
Start: 2018-03-23

## 2018-03-23 RX ORDER — SODIUM CHLORIDE 9 MG/ML
1000 INJECTION, SOLUTION INTRAVENOUS
Qty: 0 | Refills: 0 | Status: DISCONTINUED | OUTPATIENT
Start: 2018-03-23 | End: 2018-03-23

## 2018-03-23 RX ORDER — HYDROCORTISONE 20 MG
50 TABLET ORAL
Qty: 0 | Refills: 0 | COMMUNITY
Start: 2018-03-23

## 2018-03-23 RX ORDER — SODIUM,POTASSIUM PHOSPHATES 278-250MG
1 POWDER IN PACKET (EA) ORAL
Qty: 0 | Refills: 0 | Status: DISCONTINUED | OUTPATIENT
Start: 2018-03-23 | End: 2018-03-23

## 2018-03-23 RX ORDER — CALCITRIOL 0.5 UG/1
1 CAPSULE ORAL
Qty: 0 | Refills: 0 | COMMUNITY
Start: 2018-03-23

## 2018-03-23 RX ORDER — MEROPENEM 1 G/30ML
500 INJECTION INTRAVENOUS
Qty: 0 | Refills: 0 | COMMUNITY
Start: 2018-03-23

## 2018-03-23 RX ORDER — SODIUM CHLORIDE 9 MG/ML
160 INJECTION, SOLUTION INTRAVENOUS
Qty: 0 | Refills: 0 | COMMUNITY
Start: 2018-03-23

## 2018-03-23 RX ORDER — ERGOCALCIFEROL 1.25 MG/1
1 CAPSULE ORAL
Qty: 0 | Refills: 0 | COMMUNITY

## 2018-03-23 RX ORDER — SODIUM,POTASSIUM PHOSPHATES 278-250MG
1 POWDER IN PACKET (EA) ORAL
Qty: 0 | Refills: 0 | COMMUNITY
Start: 2018-03-23

## 2018-03-23 RX ORDER — MAGNESIUM SULFATE 500 MG/ML
1 VIAL (ML) INJECTION EVERY 12 HOURS
Qty: 0 | Refills: 0 | Status: DISCONTINUED | OUTPATIENT
Start: 2018-03-23 | End: 2018-03-23

## 2018-03-23 RX ORDER — PANTOPRAZOLE SODIUM 20 MG/1
1 TABLET, DELAYED RELEASE ORAL
Qty: 0 | Refills: 0 | COMMUNITY
Start: 2018-03-23

## 2018-03-23 RX ORDER — ESOMEPRAZOLE MAGNESIUM 40 MG/1
1 CAPSULE, DELAYED RELEASE ORAL
Qty: 0 | Refills: 0 | COMMUNITY

## 2018-03-23 RX ORDER — SACCHAROMYCES BOULARDII 250 MG
250 POWDER IN PACKET (EA) ORAL
Qty: 0 | Refills: 0 | Status: DISCONTINUED | OUTPATIENT
Start: 2018-03-23 | End: 2018-03-23

## 2018-03-23 RX ORDER — ERYTHROPOIETIN 10000 [IU]/ML
10000 INJECTION, SOLUTION INTRAVENOUS; SUBCUTANEOUS
Qty: 0 | Refills: 0 | COMMUNITY
Start: 2018-03-23

## 2018-03-23 RX ORDER — SODIUM,POTASSIUM PHOSPHATES 278-250MG
1 POWDER IN PACKET (EA) ORAL
Qty: 0 | Refills: 0 | Status: CANCELLED | OUTPATIENT
Start: 2019-02-20 | End: 2018-03-23

## 2018-03-23 RX ORDER — CALCIUM ACETATE 667 MG
667 TABLET ORAL
Qty: 0 | Refills: 0 | Status: DISCONTINUED | OUTPATIENT
Start: 2018-03-23 | End: 2018-03-23

## 2018-03-23 RX ADMIN — CYCLOSPORINE 75 MILLIGRAM(S): 100 CAPSULE ORAL at 17:17

## 2018-03-23 RX ADMIN — Medication 50 MILLIGRAM(S): at 13:46

## 2018-03-23 RX ADMIN — SODIUM CHLORIDE 82 MILLILITER(S): 9 INJECTION, SOLUTION INTRAVENOUS at 18:24

## 2018-03-23 RX ADMIN — CYCLOSPORINE 75 MILLIGRAM(S): 100 CAPSULE ORAL at 06:52

## 2018-03-23 RX ADMIN — MEROPENEM 100 MILLIGRAM(S): 1 INJECTION INTRAVENOUS at 05:55

## 2018-03-23 RX ADMIN — MYCOPHENOLATE MOFETIL 500 MILLIGRAM(S): 250 CAPSULE ORAL at 17:17

## 2018-03-23 RX ADMIN — Medication 100 MEQ/KG/HR: at 01:00

## 2018-03-23 RX ADMIN — Medication 103 MILLIGRAM(S): at 18:25

## 2018-03-23 RX ADMIN — Medication 103 MILLIGRAM(S): at 01:12

## 2018-03-23 RX ADMIN — MEROPENEM 100 MILLIGRAM(S): 1 INJECTION INTRAVENOUS at 17:17

## 2018-03-23 RX ADMIN — Medication 103 MILLIGRAM(S): at 13:45

## 2018-03-23 RX ADMIN — Medication 100 GRAM(S): at 17:16

## 2018-03-23 RX ADMIN — ERYTHROPOIETIN 10000 UNIT(S): 10000 INJECTION, SOLUTION INTRAVENOUS; SUBCUTANEOUS at 18:24

## 2018-03-23 RX ADMIN — SODIUM CHLORIDE 82 MILLILITER(S): 9 INJECTION, SOLUTION INTRAVENOUS at 11:28

## 2018-03-23 RX ADMIN — Medication 103 MILLIGRAM(S): at 06:52

## 2018-03-23 RX ADMIN — Medication 200 MILLIGRAM(S): at 17:17

## 2018-03-23 RX ADMIN — PANTOPRAZOLE SODIUM 40 MILLIGRAM(S): 20 TABLET, DELAYED RELEASE ORAL at 05:56

## 2018-03-23 RX ADMIN — MYCOPHENOLATE MOFETIL 500 MILLIGRAM(S): 250 CAPSULE ORAL at 05:57

## 2018-03-23 RX ADMIN — Medication 200 MILLIGRAM(S): at 05:57

## 2018-03-23 RX ADMIN — Medication 50 MICROGRAM(S): at 05:56

## 2018-03-23 RX ADMIN — Medication 50 MILLIGRAM(S): at 05:55

## 2018-03-23 RX ADMIN — CALCITRIOL 0.25 MICROGRAM(S): 0.5 CAPSULE ORAL at 12:20

## 2018-03-23 RX ADMIN — Medication 1 TABLET(S): at 13:46

## 2018-03-23 RX ADMIN — Medication 250 MILLIGRAM(S): at 17:17

## 2018-03-23 RX ADMIN — Medication 200 GRAM(S): at 12:20

## 2018-03-23 NOTE — DISCHARGE NOTE ADULT - SECONDARY DIAGNOSIS.
Alcohol abuse Anemia, unspecified type Sepsis due to Gram-negative organism with septic shock S/P kidney transplant

## 2018-03-23 NOTE — DISCHARGE NOTE ADULT - PLAN OF CARE
Stable renal function Transfer to [facility]. Stable Status post transfusion

## 2018-03-23 NOTE — PHYSICAL THERAPY INITIAL EVALUATION ADULT - BED MOBILITY LIMITATIONS, REHAB EVAL
"Chief Complaint   Patient presents with     Depression       Initial /87 (BP Location: Left arm, Patient Position: Chair, Cuff Size: Adult Regular)  Pulse 98  Temp 97.7  F (36.5  C) (Oral)  Ht 5' 11.25\" (1.81 m)  Wt 183 lb 9.6 oz (83.3 kg)  SpO2 98%  BMI 25.43 kg/m2 Estimated body mass index is 25.43 kg/(m^2) as calculated from the following:    Height as of this encounter: 5' 11.25\" (1.81 m).    Weight as of this encounter: 183 lb 9.6 oz (83.3 kg).  Medication Reconciliation: complete     Esperanza Estrella MA      " decreased ability to use legs for bridging/pushing/decreased ability to use arms for pushing/pulling/impaired ability to control trunk for mobility

## 2018-03-23 NOTE — PROGRESS NOTE ADULT - PROBLEM SELECTOR PROBLEM 1
Acute renal failure, unspecified acute renal failure type
Acute renal failure, unspecified acute renal failure type
Sepsis
Sepsis

## 2018-03-23 NOTE — PROGRESS NOTE ADULT - PROBLEM SELECTOR PLAN 1
Nephrology input appreciated, I spoke to Meghan Watts at .  Gentle hydration, patient has not been compliant with Renal transplant medications
Nephrology input appreciated, I spoke to Meghan Watts at .  Gentle hydration, patient has not been compliant with Renal transplant medications
improved BP stable

## 2018-03-23 NOTE — DISCHARGE NOTE ADULT - PATIENT PORTAL LINK FT
You can access the [facility], offered by [facility], by registering with the following website: [email or web address]

## 2018-03-23 NOTE — DISCHARGE NOTE ADULT - HOSPITAL COURSE
BRIEF HOSPITAL COURSE: 72 year old Female wih CRF S/P Kidney Transplant @ 12 years ago. + History of ETOH abuse, but apparently sober x 2 months. Prior to admission she was having increased confusion. She spoke to her son on the phone, who thought she was possibly having ETOH withdrawal, so he told her to "have a drink". In the following days she was found disheveled, confused, laying on her couch covered in urine and feces.  She was admitted and found to be severely dehydrated, with DERRICK, Creatnine of 5 and her BP was low. She was volume resuscitated, although her BP remained in the 80s. Her Hgb subsequently dropped to 6.8, without evidence of blood loss. She was transfused 2 PRBC with a appropriate response in her Hgb. Her BP also improved overnight.  Urine Culture was+ E. Coli and the IV Rocephin was continued.     The patient's transplant surgeon at [facility] was contacted, and they have agreed to take her at [facility] once she is able to transfer out of the ICU  On 3/23 her creat decreased from 3.1 to 2.6. Her potassium was low at 3.4 being replaced with Lactated Ringers with 20 Meq's of potassium at 80 cc per hour. Her magnesium was 1.4 and she was given 2 mg magnesium intravenously. She is much more awake and alert and stable for transfer.  Her hemoglobin is stable and her synthroid was increased secondary to elevated tsh and low t4, I question her compliance. Transfer to [place].

## 2018-03-23 NOTE — PHYSICAL THERAPY INITIAL EVALUATION ADULT - ADDITIONAL COMMENTS
per patient she was independent prior, able to ambulate without the use of an AD. Pt has a full flight of stairs to the bathroom. reports having had to crawl around the house before calling for assistance.

## 2018-03-23 NOTE — PROGRESS NOTE ADULT - PROBLEM SELECTOR PROBLEM 2
UTI (urinary tract infection)
Anemia, unspecified type
Anemia, unspecified type
UTI (urinary tract infection)

## 2018-03-23 NOTE — DISCHARGE NOTE ADULT - CARE PLAN
Principal Discharge DX:	Acute renal failure, unspecified acute renal failure type  Goal:	Stable renal function  Assessment and plan of treatment:	Transfer to [place] [facility].  Secondary Diagnosis:	Alcohol abuse  Goal:	Stable  Secondary Diagnosis:	Anemia, unspecified type  Goal:	Status post transfusion  Secondary Diagnosis:	Sepsis due to Gram-negative organism with septic shock  Secondary Diagnosis:	S/P kidney transplant

## 2018-03-23 NOTE — PROGRESS NOTE ADULT - SUBJECTIVE AND OBJECTIVE BOX
NEPHROLOGY INTERVAL HPI/OVERNIGHT EVENTS:    Nose bleed earlier has  stopped.    MEDICATIONS  (STANDING):  ascorbic acid IVPB 1500 milliGRAM(s) IV Intermittent every 6 hours  cycloSPORINE  , modified (GENGRAF) 75 milliGRAM(s) Oral two times a day  epoetin roseann Injectable 90256 Unit(s) SubCutaneous <User Schedule>  hydrocortisone sodium succinate Injectable 50 milliGRAM(s) IV Push every 8 hours  levothyroxine 50 MICROGram(s) Oral daily  magnesium sulfate  IVPB 2 Gram(s) IV Intermittent once  meropenem  IVPB 500 milliGRAM(s) IV Intermittent every 12 hours  mycophenolate mofetil 500 milliGRAM(s) Oral two times a day  pantoprazole    Tablet 40 milliGRAM(s) Oral before breakfast  sodium bicarbonate  Infusion 0.207 mEq/kG/Hr (100 mL/Hr) IV Continuous <Continuous>  thiamine Injectable 200 milliGRAM(s) IV Push <User Schedule>  trimethoprim   80 mG/sulfamethoxazole 400 mG 1 Tablet(s) Oral at bedtime    MEDICATIONS  (PRN):      Allergies    penicillin (Rash; Short breath; Urticaria; Hives)          Vital Signs Last 24 Hrs    T(C): 36.6 (23 Mar 2018 09:23), Max: 36.9 (22 Mar 2018 12:00)  T(F): 97.9 (23 Mar 2018 09:23), Max: 98.5 (22 Mar 2018 12:00)  HR: 91 (23 Mar 2018 09:23) (72 - 99)  BP: 96/60 (23 Mar 2018 09:23) (96/60 - 116/53)  BP(mean): 77 (22 Mar 2018 16:00) (75 - 80)  RR: 16 (23 Mar 2018 09:23) (15 - 27)  SpO2: 97% (23 Mar 2018 09:23) (95% - 99%)  T(C): 37.1 (22 Mar 2018 08:00), Max: 37.1 (22 Mar 2018 08:00)  T(F): 98.7 (22 Mar 2018 08:00), Max: 98.7 (22 Mar 2018 08:00)  HR: 74 (22 Mar 2018 10:00) (70 - 102)  BP: 100/59 (22 Mar 2018 10:00) (75/52 - 120/68)  BP(mean): 74 (22 Mar 2018 10:00) (58 - 90)  RR: 19 (22 Mar 2018 10:00) (13 - 28)  SpO2: 98% (22 Mar 2018 10:00) (93% - 100%)    PHYSICAL EXAM:  GENERAL: Frail, cachectic  HEAD:  Atraumatic, Normocephalic  EYES: EOMI  NECK: Supple, no jvd  NERVOUS SYSTEM:  Alert & Oriented X3   CHEST/LUNG: Poor air movement, no 02  HEART: Regular rate and rhythm;  ABDOMEN: Soft, Nontender, Nondistended; Bowel sounds +  EXTREMITIES:  Traceedema   herrera with 500cc, I&O  noted      LABS:  03-23    140  |  99  |  57.0<H>  ----------------------------<  134<H>  3.4<L>   |  27.0  |  2.68<H>    Ca    6.7<L>      23 Mar 2018 07:31  Phos  1.7     03-23  Mg     1.4     03-23    TPro  4.8<L>  /  Alb  2.2<L>  /  TBili  0.2<L>  /  DBili  x   /  AST  23  /  ALT  13  /  AlkPhos  157<H>  03-22                          8.5    4.6   )-----------( 245      ( 22 Mar 2018 06:28 )             26.3     03-22    137  |  101  |  68.0<H>  ----------------------------<  164<H>  3.5   |  18.0<L>  |  3.14<H>    Ca    6.8<L>      22 Mar 2018 06:28  Phos  2.5     03-22  Mg     1.2     03-22    TPro  4.8<L>  /  Alb  2.2<L>  /  TBili  0.2<L>  /  DBili  x   /  AST  23  /  ALT  13  /  AlkPhos  157<H>  03-22    Culture Results:   >100,000 CFU/ml Escherichia coli  10,000 - 49,000 CFU/mL Gram Negative Rods Identification and  susceptibility to follow. (03.20.18 @ 06:47)    % Saturation, Iron: 68 % (03.22.18 @ 06:28)    Cyclosporine Level, Serum: 202: CYCLOSPORINE: Assay performed by Chemiluminescent Microparticle  Immunoassay (CMIA) on the Abbott  system.  All immunoassay tests  are subject to rare interferences from heterophile antibodies so that if  atypical or unexpected results are obtained the lab should be notified to  confirm this result by an alternative method before adjusting medication  dosage. The therapeutic range of 150-400 ng/mL is based on trough levels  of Cyclosporine but levels for clinical management will vary depending on  the organ transplanted, post-dose time of draw, length of time  post-transplant and the individual patient's metabolism. ng/mL (03.21.18 @ 18:32)        Phosphorus Level, Serum: 2.5 mg/dL (03-22 @ 06:28)  Magnesium, Serum: 1.2 mg/dL (03-22 @ 06:28)  Intact PTH: 190 pg/mL (03-21 @ 18:32)  Vitamin D, 25-Hydroxy: 31.3 ng/mL (03-21 @ 18:32)      RADIOLOGY & ADDITIONAL TESTS:  < from: US Renal (03.21.18 @ 21:56) >     EXAM:  US KIDNEY(S)                          PROCEDURE DATE:  03/21/2018          INTERPRETATION:    Markedly distended gallbladder with gallbladder sludge.    CLINICAL INFORMATION: Acute renal failure    COMPARISON: None available.    TECHNIQUE: Sonography of the kidneys and bladder.     FINDINGS/  IMPRESSION:    Left iliac fossa renal transplant. Mild fullness of the collecting system.    Atrophic native kidneys with multiple cystic lesions in the left kidney.    Urinary bladder: Herrera catheter    Incidentally noted markedly distended gallbladder with gallbladder sludge.    < end of copied text >

## 2018-03-23 NOTE — PROGRESS NOTE ADULT - PROBLEM SELECTOR PLAN 2
Hemoglobin levels stabilized after transfusion
Hemoglobin levels stabilized after transfusion.
continue current antibiotics

## 2018-03-23 NOTE — PROGRESS NOTE ADULT - SUBJECTIVE AND OBJECTIVE BOX
AWILDA RIDDHI     Chief Complaint: Patient is a 72y old  Female who presents with a chief complaint of weakness (20 Mar 2018 10:34)      PAST MEDICAL & SURGICAL HISTORY:  Basal cell carcinoma  Renal transplant recipient  Essential hypertension  Polycystic kidney disease  S/P kidney transplant      HPI/OVERNIGHT EVENTS: Patient sitting up in no distress. Renal input appreciated, her creatinine is trending down.    MEDICATIONS  (STANDING):  ascorbic acid IVPB 1500 milliGRAM(s) IV Intermittent every 6 hours  calcitriol   Capsule 0.25 MICROGram(s) Oral daily  calcium carbonate 500 mG (Tums) Chewable 1 Tablet(s) Chew three times a day  cycloSPORINE  , modified (GENGRAF) 75 milliGRAM(s) Oral two times a day  epoetin roseann Injectable 64329 Unit(s) SubCutaneous <User Schedule>  hydrocortisone sodium succinate Injectable 50 milliGRAM(s) IV Push every 8 hours  lactated ringers 1000 milliLiter(s) (82 mL/Hr) IV Continuous <Continuous>  levothyroxine 50 MICROGram(s) Oral daily  magnesium sulfate  IVPB 1 Gram(s) IV Intermittent every 12 hours  meropenem  IVPB 500 milliGRAM(s) IV Intermittent every 12 hours  mycophenolate mofetil 500 milliGRAM(s) Oral two times a day  pantoprazole    Tablet 40 milliGRAM(s) Oral before breakfast  thiamine Injectable 200 milliGRAM(s) IV Push <User Schedule>  trimethoprim   80 mG/sulfamethoxazole 400 mG 1 Tablet(s) Oral at bedtime      Vital Signs Last 24 Hrs  T(C): 36.6 (23 Mar 2018 09:23), Max: 36.6 (23 Mar 2018 09:23)  T(F): 97.9 (23 Mar 2018 09:23), Max: 97.9 (23 Mar 2018 09:23)  HR: 91 (23 Mar 2018 09:23) (74 - 99)  BP: 96/60 (23 Mar 2018 09:23) (96/60 - 116/53)  BP(mean): 77 (22 Mar 2018 16:00) (76 - 77)  RR: 16 (23 Mar 2018 09:23) (15 - 26)  SpO2: 97% (23 Mar 2018 09:23) (96% - 99%)    PHYSICAL EXAM:  Constitutional: NAD, well-groomed, well-developed  HEENT: PERRLA, EOMI, Normal Hearing, MMM  Neck: No LAD, No JVD  Back: Normal spine flexure, No CVA tenderness  Respiratory: CTAB Cardiovascular: S1 and S2, RRR, no M/G/R  Gastrointestinal: BS+, soft, NT/ND  Extremities: No peripheral edema  Vascular: 2+ peripheral pulses  Neurological: A/O x 3, Difficulty ambulating.    CAPILLARY BLOOD GLUCOSE    LABS:                        8.2    4.4   )-----------( 263      ( 23 Mar 2018 07:31 )             25.0     03-23    140  |  99  |  57.0<H>  ----------------------------<  134<H>  3.4<L>   |  27.0  |  2.68<H>    Ca    6.7<L>      23 Mar 2018 07:31  Phos  1.7     03-23  Mg     1.4     03-23    TPro  4.8<L>  /  Alb  2.2<L>  /  TBili  0.2<L>  /  DBili  x   /  AST  23  /  ALT  13  /  AlkPhos  157<H>  03-22          RADIOLOGY & ADDITIONAL TESTS:

## 2018-03-23 NOTE — DISCHARGE NOTE ADULT - MEDICATION SUMMARY - MEDICATIONS TO TAKE
I will START or STAY ON the medications listed below when I get home from the hospital:    hydrocortisone  -- 50 milligram(s) intravenous 2 times a day  Started per sepsis protocol, taper as tolerated.  -- Indication: For Sepsis    Aspirin Enteric Coated 81 mg oral delayed release tablet  -- 1 tab(s) by mouth once a day  -- Indication: For Essential hypertension    calcium carbonate 500 mg (200 mg elemental calcium) oral tablet, chewable  -- 1 tab(s) by mouth 3 times a day  -- Indication: For Supplement    metoprolol succinate 50 mg oral tablet, extended release  -- 1 tab(s) by mouth 2 times a day  -- Indication: For HTN    meropenem 500 mg intravenous injection  -- 500 milligram(s) intravenous every 12 hours  Started 3/21  -- Indication: For Sepsis    epoetin roseann  -- 16127 unit(s) subcutaneous 3 times a week (Mon Wed Fri)  -- Indication: For Anemia    CellCept 500 mg oral tablet  -- 1 tab(s) by mouth 2 times a day  -- Indication: For Transplant    cycloSPORINE modified 25 mg oral capsule  -- 3 cap(s) by mouth 2 times a day  -- Indication: For Transplant    Lactated Ringers Injection intravenous solution  -- with 20 Milliequivalants of potassium  -- Indication: For Acute renal failure, unspecified acute renal failure type    potassium phosphate-sodium phosphate 250 mg-280 mg-160 mg oral powder for reconstitution  -- 1 packet(s) by mouth 2 times a day  Started 3/21  -- Indication: For Supplemeny    pantoprazole 40 mg oral delayed release tablet  -- 1 tab(s) by mouth once a day (before a meal)  -- Indication: For Gerd    Bactrim 400 mg-80 mg oral tablet  -- 1 tab(s) by mouth once a day (at bedtime)  Started 3/20  -- Indication: For Sepsis    levothyroxine 75 mcg (0.075 mg) oral tablet  -- 1 tab(s) by mouth once a day  Increased from 50 secondary to elevated TSH  -- Indication: For hypothyroid    calcitriol 0.25 mcg oral capsule  -- 1 cap(s) by mouth once a day  -- Indication: For Acute renal failure, unspecified acute renal failure type

## 2018-03-23 NOTE — PROGRESS NOTE ADULT - ASSESSMENT
72 year old female renal transplant, acute renal failure, uti, etoh abuse, anemia.  On 3/23 patient continues to improve and is stable for transfer to [place].

## 2018-03-25 LAB
CULTURE RESULTS: SIGNIFICANT CHANGE UP
CULTURE RESULTS: SIGNIFICANT CHANGE UP
SPECIMEN SOURCE: SIGNIFICANT CHANGE UP
SPECIMEN SOURCE: SIGNIFICANT CHANGE UP

## 2018-12-09 NOTE — PHYSICAL THERAPY INITIAL EVALUATION ADULT - FUNCTIONAL LIMITATIONS, PT EVAL
Lab Results   Component Value Date    HGBA1C 9 7 (H) 04/13/2018       No results for input(s): POCGLU in the last 72 hours  Blood Sugar Average: Last 72 hrs:   suboptimal control I have counseled patient to follow a healthy/balance diet, reduce carbohydrates and sweets ultimately we would like to obtain a hemoglobin A1c under 7   She continues to work with Endocrinology self-care/home management

## 2024-02-10 NOTE — PROGRESS NOTE ADULT - ASSESSMENT
Patient is a a 73 yo f pmhx polycystic kidney disease s/p transplant (2004) on Cellcept and Tacrolimus, HTN, colitis and GERD biba from home for ams found to be septic secondary to UTI and anemic.   She is now S/p transfusion with improved BP. Her creatnine has also improved with IV hydration. Urine was + for E. Coli and she was placed on IV rocephin.    At this time she no longer requires the ICU and can be moved to the medical floors for her continued care. Dr Franco will assume her care at this point.     Will continue IV ABX, Hydration with Bicarb., Reconnect with [facility] regarding transfer to the transplant service. Inpatient Records

## 2024-04-14 NOTE — DIETITIAN INITIAL EVALUATION ADULT. - PHYSICAL APPEARANCE
Patient is sitting in the chair and reports feeling well. Pt is ready to work with cardiopulmonary rehab at this time. Pre exercise VSS. Slight drop in SBP with standing, but pt feeling stable to walk Pt ambulated 300' with stand by assist, pushing 2ww, and a gait belt for added safety. Ambulated on RA and SpO2 remained stable. Pt reports c/o feeling slightly \"off\" in his head, which was been normal for him with standing/walking. Assisted pt back to the bed. Post exercise VSS. Call light given and voices no other needs at this time. RN aware of session tolerance.     04/14/24 0930   Session Information   Phase Phase I   Session Number 4   Pre-Session Evaluation   Is patient on O2? N   Resting Vital Signs   Resting Heart Rate 69   Resting /57   Resting Dyspnea No   SpO2 94 %   O2 Device None/Room air   Exercise/Activity   Exercise/Activity Walking   Walking Exercise Assessment    Walking Intensity Moderate   Walking Adjusted Workload 2ww, gait belt   Walking Distance (ft) 300 Feet   During Exercise   Number of Assists Used Stand-by   Gait Mosty steady   Signs & Symptoms Incisional discomfort   Exercise HR 70   Exercise Dyspnea No   SpO2 93 %   O2 Device None/room air   Recovery Vital Signs   Recovery HR 70   Recovery /67   Recovery Dyspnea No   SpO2 93 %   O2 Device None/room air        weight WNL; BMI 27